# Patient Record
Sex: FEMALE | Race: BLACK OR AFRICAN AMERICAN | Employment: OTHER | URBAN - METROPOLITAN AREA
[De-identification: names, ages, dates, MRNs, and addresses within clinical notes are randomized per-mention and may not be internally consistent; named-entity substitution may affect disease eponyms.]

---

## 2017-11-02 ENCOUNTER — HOSPITAL ENCOUNTER (OUTPATIENT)
Dept: PREADMISSION TESTING | Age: 67
Discharge: HOME | End: 2017-11-02
Payer: MEDICARE

## 2017-11-02 VITALS
HEIGHT: 65 IN | WEIGHT: 204 LBS | TEMPERATURE: 97.8 F | BODY MASS INDEX: 33.99 KG/M2 | HEART RATE: 85 BPM | DIASTOLIC BLOOD PRESSURE: 83 MMHG | SYSTOLIC BLOOD PRESSURE: 143 MMHG

## 2017-11-02 LAB
ABO + RH BLD: NORMAL
ANION GAP SERPL CALC-SCNC: 7 MMOL/L (ref 5–15)
APPEARANCE UR: CLEAR
ATRIAL RATE: 64 BPM
BACTERIA URNS QL MICRO: NEGATIVE /HPF
BILIRUB UR QL: NEGATIVE
BLOOD GROUP ANTIBODIES SERPL: NORMAL
BUN SERPL-MCNC: 13 MG/DL (ref 6–20)
BUN/CREAT SERPL: 20 (ref 12–20)
CALCIUM SERPL-MCNC: 9.1 MG/DL (ref 8.5–10.1)
CALCULATED P AXIS, ECG09: 56 DEGREES
CALCULATED R AXIS, ECG10: -41 DEGREES
CALCULATED T AXIS, ECG11: 3 DEGREES
CHLORIDE SERPL-SCNC: 100 MMOL/L (ref 97–108)
CO2 SERPL-SCNC: 30 MMOL/L (ref 21–32)
COLOR UR: NORMAL
CREAT SERPL-MCNC: 0.65 MG/DL (ref 0.55–1.02)
DIAGNOSIS, 93000: NORMAL
EPITH CASTS URNS QL MICRO: NORMAL /LPF
ERYTHROCYTE [DISTWIDTH] IN BLOOD BY AUTOMATED COUNT: 13.2 % (ref 11.5–14.5)
EST. AVERAGE GLUCOSE BLD GHB EST-MCNC: 117 MG/DL
GLUCOSE SERPL-MCNC: 98 MG/DL (ref 65–100)
GLUCOSE UR STRIP.AUTO-MCNC: NEGATIVE MG/DL
HBA1C MFR BLD: 5.7 % (ref 4.2–6.3)
HCT VFR BLD AUTO: 40.3 % (ref 35–47)
HGB BLD-MCNC: 13 G/DL (ref 11.5–16)
HGB UR QL STRIP: NEGATIVE
HYALINE CASTS URNS QL MICRO: NORMAL /LPF (ref 0–5)
INR PPP: 1 (ref 0.9–1.1)
KETONES UR QL STRIP.AUTO: NEGATIVE MG/DL
LEUKOCYTE ESTERASE UR QL STRIP.AUTO: NEGATIVE
MCH RBC QN AUTO: 28.3 PG (ref 26–34)
MCHC RBC AUTO-ENTMCNC: 32.3 G/DL (ref 30–36.5)
MCV RBC AUTO: 87.6 FL (ref 80–99)
NITRITE UR QL STRIP.AUTO: NEGATIVE
P-R INTERVAL, ECG05: 170 MS
PH UR STRIP: 5 [PH] (ref 5–8)
PLATELET # BLD AUTO: 269 K/UL (ref 150–400)
POTASSIUM SERPL-SCNC: 4.3 MMOL/L (ref 3.5–5.1)
PROT UR STRIP-MCNC: NEGATIVE MG/DL
PROTHROMBIN TIME: 10 SEC (ref 9–11.1)
Q-T INTERVAL, ECG07: 424 MS
QRS DURATION, ECG06: 78 MS
QTC CALCULATION (BEZET), ECG08: 437 MS
RBC # BLD AUTO: 4.6 M/UL (ref 3.8–5.2)
RBC #/AREA URNS HPF: NORMAL /HPF (ref 0–5)
SODIUM SERPL-SCNC: 137 MMOL/L (ref 136–145)
SP GR UR REFRACTOMETRY: 1.02 (ref 1–1.03)
SPECIMEN EXP DATE BLD: NORMAL
UA: UC IF INDICATED,UAUC: NORMAL
UROBILINOGEN UR QL STRIP.AUTO: 0.2 EU/DL (ref 0.2–1)
VENTRICULAR RATE, ECG03: 64 BPM
WBC # BLD AUTO: 8.7 K/UL (ref 3.6–11)
WBC URNS QL MICRO: NORMAL /HPF (ref 0–4)

## 2017-11-02 PROCEDURE — 93005 ELECTROCARDIOGRAM TRACING: CPT

## 2017-11-02 PROCEDURE — 80048 BASIC METABOLIC PNL TOTAL CA: CPT | Performed by: ORTHOPAEDIC SURGERY

## 2017-11-02 PROCEDURE — 85027 COMPLETE CBC AUTOMATED: CPT | Performed by: ORTHOPAEDIC SURGERY

## 2017-11-02 PROCEDURE — 81001 URINALYSIS AUTO W/SCOPE: CPT | Performed by: ORTHOPAEDIC SURGERY

## 2017-11-02 PROCEDURE — 85610 PROTHROMBIN TIME: CPT | Performed by: ORTHOPAEDIC SURGERY

## 2017-11-02 PROCEDURE — 36415 COLL VENOUS BLD VENIPUNCTURE: CPT | Performed by: ORTHOPAEDIC SURGERY

## 2017-11-02 PROCEDURE — 83036 HEMOGLOBIN GLYCOSYLATED A1C: CPT | Performed by: ORTHOPAEDIC SURGERY

## 2017-11-02 PROCEDURE — 86900 BLOOD TYPING SEROLOGIC ABO: CPT | Performed by: ORTHOPAEDIC SURGERY

## 2017-11-02 RX ORDER — AMITRIPTYLINE HYDROCHLORIDE 100 MG/1
100 TABLET, FILM COATED ORAL
COMMUNITY
Start: 2017-10-30

## 2017-11-02 RX ORDER — CHOLECALCIFEROL (VITAMIN D3) 125 MCG
800 CAPSULE ORAL
COMMUNITY
End: 2017-11-10

## 2017-11-02 RX ORDER — BISMUTH SUBSALICYLATE 262 MG
1 TABLET,CHEWABLE ORAL DAILY
COMMUNITY

## 2017-11-02 RX ORDER — WARFARIN SODIUM 5 MG/1
TABLET ORAL
COMMUNITY
Start: 2017-10-31 | End: 2017-11-10

## 2017-11-02 NOTE — ADVANCED PRACTICE NURSE
Orthopedic pre-op assessment and planning form sent for scanning. Preoperative instructions reviewed with patient. Patient given 2-6 packs of CHG wipes. Instructions reviewed in class on use of CHG wipes. Patient given SSI infection FAQS sheet, as well as a  MRSA/MSSA treatment instruction sheet  With an explanation to patient that they will be notified if treatment instructions need to be initiated. Patient was given the opportunity to ask questions on the information provided.

## 2017-11-03 LAB
BACTERIA SPEC CULT: NORMAL
BACTERIA SPEC CULT: NORMAL
SERVICE CMNT-IMP: NORMAL

## 2017-11-06 PROBLEM — M16.11 PRIMARY OSTEOARTHRITIS OF RIGHT HIP: Status: ACTIVE | Noted: 2017-11-06

## 2017-11-06 NOTE — H&P
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 On her last visit I saw her for the right knee and at that time she received an aspiration and injection for the knee which was helpful. She now presents with severe hip pain. She feels that it is difficult to walk even across the room and feels that the pain affects her work activities. She denies left sided symptoms.          Objective:   Constitutional:  No acute distress. Well nourished. Well developed. Eyes:  Sclera are nonicteric. Respiratory:  No labored breathing. Cardiovascular:  No marked edema. Skin:  No marked skin ulcers. Neurological:  No marked sensory loss noted. Psychiatric: Alert and oriented x3. Musculoskeletal      On exam she ambulates with a marked limp. Any attempts to rotate the hip are painful. Her leg lengths are basically equal. She has a 10 degree flexion contracture on the right hip. No signs of infection. No effusion. No cellulitis or rash. No evidence of calf pain, no sign of DVT. The neurovascular status is intact. Radiographs:       I reviewed previous x-rays from August noting marked degenerative arthritis of the right hip with bone on bone contact. No imaging obtained       Assessment:      1. Primary osteoarthritis of right hip            Plan:   I reviewed with the patient that she has marked degenerative arthritis of the right hip and reviewed the pathophysiology. I discussed steroid injection but with the severity of arthritis I do not believe it will provide significant relief. I explained that the only option that would give her long term relief is right total hip replacement.     I explained the hospitalization, post-op and rehabilitation for the procedure. The patient is aware of all complications associated with the surgery, including the chance of infection and DVT. We discussed issues related to infection and DVT and we discussed Coumadin use. We also discussed issues related to leg length and instability.  She understands the period of post-op rehabilitation and time out of work following the surgery.     Following discussion she would like to proceed with right total hip replacement. She understands all potential complications    PROCEDURE: Right total hip replacement. Date of Surgery Update:  Regina Hernández was seen and examined. Past Medical History:   Diagnosis Date    Acid reflux     Arthritis     PUD (peptic ulcer disease)     CHILDHOOD     Prior to Admission Medications   Prescriptions Last Dose Informant Patient Reported? Taking?   amitriptyline (ELAVIL) 100 mg tablet 11/8/2017 at 0700  Yes Yes   Sig: Take 100 mg by mouth nightly. aspirin 500 mg tablet 10/25/2017  Yes No   Sig: Take 1,000 mg by mouth every three (3) days. multivitamin (ONE A DAY) tablet 10/8/2017 at Unknown time  Yes Yes   Sig: Take 1 Tab by mouth daily. naproxen sodium (ALEVE) 220 mg cap 10/25/2017  Yes No   Sig: Take 800 mg by mouth every three (3) days. warfarin (COUMADIN) 5 mg tablet 11/7/2017 at Unknown time  Yes Yes   Sig: Take 1 tablet po the night before surgery      Facility-Administered Medications: None      Allergy to:Codeine  Physical Examination: General appearance - alert, well appearing, and in no distress  History and physical has been reviewed. The patient has been examined.  There have been no significant clinical changes since the completion of the originally dated History and Physical.    Signed By: Alvis Lesches, PA-C     November 8, 2017 10:16 AM

## 2017-11-07 ENCOUNTER — ANESTHESIA EVENT (OUTPATIENT)
Dept: SURGERY | Age: 67
DRG: 470 | End: 2017-11-07
Payer: MEDICARE

## 2017-11-08 ENCOUNTER — ANESTHESIA (OUTPATIENT)
Dept: SURGERY | Age: 67
DRG: 470 | End: 2017-11-08
Payer: MEDICARE

## 2017-11-08 ENCOUNTER — APPOINTMENT (OUTPATIENT)
Dept: GENERAL RADIOLOGY | Age: 67
DRG: 470 | End: 2017-11-08
Attending: ORTHOPAEDIC SURGERY
Payer: MEDICARE

## 2017-11-08 ENCOUNTER — APPOINTMENT (OUTPATIENT)
Dept: GENERAL RADIOLOGY | Age: 67
DRG: 470 | End: 2017-11-08
Payer: MEDICARE

## 2017-11-08 ENCOUNTER — HOSPITAL ENCOUNTER (INPATIENT)
Age: 67
LOS: 2 days | Discharge: HOME HEALTH | DRG: 470 | End: 2017-11-10
Attending: ORTHOPAEDIC SURGERY | Admitting: ORTHOPAEDIC SURGERY
Payer: MEDICARE

## 2017-11-08 LAB
GLUCOSE BLD STRIP.AUTO-MCNC: 102 MG/DL (ref 65–100)
SERVICE CMNT-IMP: ABNORMAL

## 2017-11-08 PROCEDURE — 77030020788: Performed by: ORTHOPAEDIC SURGERY

## 2017-11-08 PROCEDURE — 77030031139 HC SUT VCRL2 J&J -A: Performed by: ORTHOPAEDIC SURGERY

## 2017-11-08 PROCEDURE — 74011000250 HC RX REV CODE- 250: Performed by: PHYSICIAN ASSISTANT

## 2017-11-08 PROCEDURE — 77030013079 HC BLNKT BAIR HGGR 3M -A: Performed by: ANESTHESIOLOGY

## 2017-11-08 PROCEDURE — 77030018836 HC SOL IRR NACL ICUM -A: Performed by: ORTHOPAEDIC SURGERY

## 2017-11-08 PROCEDURE — 77030012890

## 2017-11-08 PROCEDURE — 77030008467 HC STPLR SKN COVD -B: Performed by: ORTHOPAEDIC SURGERY

## 2017-11-08 PROCEDURE — 77030035236 HC SUT PDS STRATFX BARB J&J -B: Performed by: ORTHOPAEDIC SURGERY

## 2017-11-08 PROCEDURE — 73501 X-RAY EXAM HIP UNI 1 VIEW: CPT

## 2017-11-08 PROCEDURE — 74011250637 HC RX REV CODE- 250/637: Performed by: PHYSICIAN ASSISTANT

## 2017-11-08 PROCEDURE — 74011250636 HC RX REV CODE- 250/636: Performed by: PHYSICIAN ASSISTANT

## 2017-11-08 PROCEDURE — 77030032490 HC SLV COMPR SCD KNE COVD -B: Performed by: ORTHOPAEDIC SURGERY

## 2017-11-08 PROCEDURE — 77030012935 HC DRSG AQUACEL BMS -B: Performed by: ORTHOPAEDIC SURGERY

## 2017-11-08 PROCEDURE — 0SR90JA REPLACEMENT OF RIGHT HIP JOINT WITH SYNTHETIC SUBSTITUTE, UNCEMENTED, OPEN APPROACH: ICD-10-PCS | Performed by: ORTHOPAEDIC SURGERY

## 2017-11-08 PROCEDURE — 76060000036 HC ANESTHESIA 2.5 TO 3 HR: Performed by: ORTHOPAEDIC SURGERY

## 2017-11-08 PROCEDURE — 77030034850: Performed by: ORTHOPAEDIC SURGERY

## 2017-11-08 PROCEDURE — 82962 GLUCOSE BLOOD TEST: CPT

## 2017-11-08 PROCEDURE — 74011000258 HC RX REV CODE- 258

## 2017-11-08 PROCEDURE — 76010000172 HC OR TIME 2.5 TO 3 HR INTENSV-TIER 1: Performed by: ORTHOPAEDIC SURGERY

## 2017-11-08 PROCEDURE — 76210000006 HC OR PH I REC 0.5 TO 1 HR: Performed by: ORTHOPAEDIC SURGERY

## 2017-11-08 PROCEDURE — 74011250636 HC RX REV CODE- 250/636: Performed by: ANESTHESIOLOGY

## 2017-11-08 PROCEDURE — 77030006822 HC BLD SAW SAG BRSM -B: Performed by: ORTHOPAEDIC SURGERY

## 2017-11-08 PROCEDURE — 74011000250 HC RX REV CODE- 250

## 2017-11-08 PROCEDURE — 74011250636 HC RX REV CODE- 250/636

## 2017-11-08 PROCEDURE — 77030020782 HC GWN BAIR PAWS FLX 3M -B

## 2017-11-08 PROCEDURE — 77030018846 HC SOL IRR STRL H20 ICUM -A: Performed by: ORTHOPAEDIC SURGERY

## 2017-11-08 PROCEDURE — 77030012406 HC DRN WND PENRS BARD -A: Performed by: ORTHOPAEDIC SURGERY

## 2017-11-08 PROCEDURE — 77030011264 HC ELECTRD BLD EXT COVD -A: Performed by: ORTHOPAEDIC SURGERY

## 2017-11-08 PROCEDURE — 77030011640 HC PAD GRND REM COVD -A: Performed by: ORTHOPAEDIC SURGERY

## 2017-11-08 PROCEDURE — 77030007866 HC KT SPN ANES BBMI -B: Performed by: ANESTHESIOLOGY

## 2017-11-08 PROCEDURE — C1776 JOINT DEVICE (IMPLANTABLE): HCPCS | Performed by: ORTHOPAEDIC SURGERY

## 2017-11-08 PROCEDURE — 65270000029 HC RM PRIVATE

## 2017-11-08 DEVICE — SCR ACET CANC PINN 6.5X15MM SS --: Type: IMPLANTABLE DEVICE | Site: HIP | Status: FUNCTIONAL

## 2017-11-08 DEVICE — STEM FEMORAL SUMMIT: Type: IMPLANTABLE DEVICE | Site: HIP | Status: FUNCTIONAL

## 2017-11-08 DEVICE — COMPONENT TOT HIP PRIMARY MTL ON POLYETH: Type: IMPLANTABLE DEVICE | Status: FUNCTIONAL

## 2017-11-08 DEVICE — LINER ACET OD54MM ID36MM HIP ALTRX PINN: Type: IMPLANTABLE DEVICE | Site: HIP | Status: FUNCTIONAL

## 2017-11-08 DEVICE — CUP ACET DIA54MM HIP GRIPTION PRI CEMENTLESS FIX SECT SER: Type: IMPLANTABLE DEVICE | Site: HIP | Status: FUNCTIONAL

## 2017-11-08 DEVICE — HEAD FEM DIA36MM -2MM OFFSET 12/14 TAPR ARTC EZ HIP MTL: Type: IMPLANTABLE DEVICE | Site: HIP | Status: FUNCTIONAL

## 2017-11-08 RX ORDER — MIDAZOLAM HYDROCHLORIDE 1 MG/ML
0.5 INJECTION, SOLUTION INTRAMUSCULAR; INTRAVENOUS
Status: DISCONTINUED | OUTPATIENT
Start: 2017-11-08 | End: 2017-11-08 | Stop reason: HOSPADM

## 2017-11-08 RX ORDER — CEFAZOLIN SODIUM/WATER 2 G/20 ML
2 SYRINGE (ML) INTRAVENOUS EVERY 8 HOURS
Status: COMPLETED | OUTPATIENT
Start: 2017-11-08 | End: 2017-11-09

## 2017-11-08 RX ORDER — TRAMADOL HYDROCHLORIDE 50 MG/1
50 TABLET ORAL
Status: DISCONTINUED | OUTPATIENT
Start: 2017-11-08 | End: 2017-11-10 | Stop reason: HOSPADM

## 2017-11-08 RX ORDER — MIDAZOLAM HYDROCHLORIDE 1 MG/ML
INJECTION, SOLUTION INTRAMUSCULAR; INTRAVENOUS AS NEEDED
Status: DISCONTINUED | OUTPATIENT
Start: 2017-11-08 | End: 2017-11-08 | Stop reason: HOSPADM

## 2017-11-08 RX ORDER — AMOXICILLIN 250 MG
1 CAPSULE ORAL 2 TIMES DAILY
Status: DISCONTINUED | OUTPATIENT
Start: 2017-11-08 | End: 2017-11-10 | Stop reason: HOSPADM

## 2017-11-08 RX ORDER — ACETAMINOPHEN 325 MG/1
650 TABLET ORAL
Status: DISCONTINUED | OUTPATIENT
Start: 2017-11-08 | End: 2017-11-10 | Stop reason: HOSPADM

## 2017-11-08 RX ORDER — MIDAZOLAM HYDROCHLORIDE 1 MG/ML
1 INJECTION, SOLUTION INTRAMUSCULAR; INTRAVENOUS AS NEEDED
Status: DISCONTINUED | OUTPATIENT
Start: 2017-11-08 | End: 2017-11-08 | Stop reason: HOSPADM

## 2017-11-08 RX ORDER — NALOXONE HYDROCHLORIDE 0.4 MG/ML
0.4 INJECTION, SOLUTION INTRAMUSCULAR; INTRAVENOUS; SUBCUTANEOUS AS NEEDED
Status: DISCONTINUED | OUTPATIENT
Start: 2017-11-08 | End: 2017-11-10 | Stop reason: HOSPADM

## 2017-11-08 RX ORDER — ONDANSETRON 2 MG/ML
4 INJECTION INTRAMUSCULAR; INTRAVENOUS
Status: ACTIVE | OUTPATIENT
Start: 2017-11-08 | End: 2017-11-09

## 2017-11-08 RX ORDER — SODIUM CHLORIDE 0.9 % (FLUSH) 0.9 %
5-10 SYRINGE (ML) INJECTION EVERY 8 HOURS
Status: DISCONTINUED | OUTPATIENT
Start: 2017-11-09 | End: 2017-11-10 | Stop reason: HOSPADM

## 2017-11-08 RX ORDER — SODIUM CHLORIDE 9 MG/ML
1000 INJECTION, SOLUTION INTRAVENOUS CONTINUOUS
Status: DISCONTINUED | OUTPATIENT
Start: 2017-11-08 | End: 2017-11-08 | Stop reason: HOSPADM

## 2017-11-08 RX ORDER — ACETAMINOPHEN 10 MG/ML
INJECTION, SOLUTION INTRAVENOUS AS NEEDED
Status: DISCONTINUED | OUTPATIENT
Start: 2017-11-08 | End: 2017-11-08 | Stop reason: HOSPADM

## 2017-11-08 RX ORDER — DEXAMETHASONE SODIUM PHOSPHATE 100 MG/10ML
10 INJECTION INTRAMUSCULAR; INTRAVENOUS ONCE
Status: DISCONTINUED | OUTPATIENT
Start: 2017-11-08 | End: 2017-11-08 | Stop reason: HOSPADM

## 2017-11-08 RX ORDER — SODIUM CHLORIDE 0.9 % (FLUSH) 0.9 %
5-10 SYRINGE (ML) INJECTION AS NEEDED
Status: DISCONTINUED | OUTPATIENT
Start: 2017-11-08 | End: 2017-11-10 | Stop reason: HOSPADM

## 2017-11-08 RX ORDER — ONDANSETRON 2 MG/ML
4 INJECTION INTRAMUSCULAR; INTRAVENOUS AS NEEDED
Status: DISCONTINUED | OUTPATIENT
Start: 2017-11-08 | End: 2017-11-08 | Stop reason: HOSPADM

## 2017-11-08 RX ORDER — LIDOCAINE HYDROCHLORIDE 20 MG/ML
INJECTION, SOLUTION EPIDURAL; INFILTRATION; INTRACAUDAL; PERINEURAL AS NEEDED
Status: DISCONTINUED | OUTPATIENT
Start: 2017-11-08 | End: 2017-11-08 | Stop reason: HOSPADM

## 2017-11-08 RX ORDER — DEXTROSE, SODIUM CHLORIDE, SODIUM LACTATE, POTASSIUM CHLORIDE, AND CALCIUM CHLORIDE 5; .6; .31; .03; .02 G/100ML; G/100ML; G/100ML; G/100ML; G/100ML
25 INJECTION, SOLUTION INTRAVENOUS CONTINUOUS
Status: DISCONTINUED | OUTPATIENT
Start: 2017-11-08 | End: 2017-11-08 | Stop reason: HOSPADM

## 2017-11-08 RX ORDER — BUPIVACAINE HYDROCHLORIDE 5 MG/ML
INJECTION, SOLUTION EPIDURAL; INTRACAUDAL AS NEEDED
Status: DISCONTINUED | OUTPATIENT
Start: 2017-11-08 | End: 2017-11-08 | Stop reason: HOSPADM

## 2017-11-08 RX ORDER — FENTANYL CITRATE 50 UG/ML
50 INJECTION, SOLUTION INTRAMUSCULAR; INTRAVENOUS AS NEEDED
Status: DISCONTINUED | OUTPATIENT
Start: 2017-11-08 | End: 2017-11-08 | Stop reason: HOSPADM

## 2017-11-08 RX ORDER — SODIUM CHLORIDE 0.9 % (FLUSH) 0.9 %
5-10 SYRINGE (ML) INJECTION AS NEEDED
Status: DISCONTINUED | OUTPATIENT
Start: 2017-11-08 | End: 2017-11-08 | Stop reason: HOSPADM

## 2017-11-08 RX ORDER — SODIUM CHLORIDE 9 MG/ML
25 INJECTION, SOLUTION INTRAVENOUS CONTINUOUS
Status: DISCONTINUED | OUTPATIENT
Start: 2017-11-08 | End: 2017-11-08 | Stop reason: HOSPADM

## 2017-11-08 RX ORDER — SODIUM CHLORIDE 0.9 % (FLUSH) 0.9 %
5-10 SYRINGE (ML) INJECTION EVERY 8 HOURS
Status: DISCONTINUED | OUTPATIENT
Start: 2017-11-08 | End: 2017-11-08 | Stop reason: HOSPADM

## 2017-11-08 RX ORDER — HYDROXYZINE HYDROCHLORIDE 10 MG/1
10 TABLET, FILM COATED ORAL
Status: DISCONTINUED | OUTPATIENT
Start: 2017-11-08 | End: 2017-11-10 | Stop reason: HOSPADM

## 2017-11-08 RX ORDER — OXYCODONE HYDROCHLORIDE 5 MG/1
5 TABLET ORAL
Status: DISCONTINUED | OUTPATIENT
Start: 2017-11-08 | End: 2017-11-08

## 2017-11-08 RX ORDER — HYDROMORPHONE HYDROCHLORIDE 1 MG/ML
0.2 INJECTION, SOLUTION INTRAMUSCULAR; INTRAVENOUS; SUBCUTANEOUS
Status: DISCONTINUED | OUTPATIENT
Start: 2017-11-08 | End: 2017-11-08 | Stop reason: HOSPADM

## 2017-11-08 RX ORDER — DIPHENHYDRAMINE HYDROCHLORIDE 50 MG/ML
12.5 INJECTION, SOLUTION INTRAMUSCULAR; INTRAVENOUS AS NEEDED
Status: DISCONTINUED | OUTPATIENT
Start: 2017-11-08 | End: 2017-11-08 | Stop reason: HOSPADM

## 2017-11-08 RX ORDER — CEFAZOLIN SODIUM/WATER 2 G/20 ML
2 SYRINGE (ML) INTRAVENOUS EVERY 8 HOURS
Status: DISCONTINUED | OUTPATIENT
Start: 2017-11-08 | End: 2017-11-08 | Stop reason: HOSPADM

## 2017-11-08 RX ORDER — MORPHINE SULFATE 10 MG/ML
2 INJECTION, SOLUTION INTRAMUSCULAR; INTRAVENOUS
Status: DISCONTINUED | OUTPATIENT
Start: 2017-11-08 | End: 2017-11-08 | Stop reason: HOSPADM

## 2017-11-08 RX ORDER — AMITRIPTYLINE HYDROCHLORIDE 50 MG/1
100 TABLET, FILM COATED ORAL
Status: DISCONTINUED | OUTPATIENT
Start: 2017-11-08 | End: 2017-11-10 | Stop reason: HOSPADM

## 2017-11-08 RX ORDER — DEXAMETHASONE SODIUM PHOSPHATE 10 MG/ML
10 INJECTION INTRAMUSCULAR; INTRAVENOUS ONCE
Status: COMPLETED | OUTPATIENT
Start: 2017-11-09 | End: 2017-11-09

## 2017-11-08 RX ORDER — HYDROMORPHONE HYDROCHLORIDE 1 MG/ML
0.5 INJECTION, SOLUTION INTRAMUSCULAR; INTRAVENOUS; SUBCUTANEOUS
Status: ACTIVE | OUTPATIENT
Start: 2017-11-08 | End: 2017-11-09

## 2017-11-08 RX ORDER — FENTANYL CITRATE 50 UG/ML
25 INJECTION, SOLUTION INTRAMUSCULAR; INTRAVENOUS
Status: DISCONTINUED | OUTPATIENT
Start: 2017-11-08 | End: 2017-11-08 | Stop reason: HOSPADM

## 2017-11-08 RX ORDER — CELECOXIB 200 MG/1
200 CAPSULE ORAL 2 TIMES DAILY
Status: DISCONTINUED | OUTPATIENT
Start: 2017-11-08 | End: 2017-11-10 | Stop reason: HOSPADM

## 2017-11-08 RX ORDER — FAMOTIDINE 20 MG/1
20 TABLET, FILM COATED ORAL
Status: DISCONTINUED | OUTPATIENT
Start: 2017-11-08 | End: 2017-11-10 | Stop reason: HOSPADM

## 2017-11-08 RX ORDER — OXYCODONE HYDROCHLORIDE 5 MG/1
10 TABLET ORAL
Status: DISCONTINUED | OUTPATIENT
Start: 2017-11-08 | End: 2017-11-08

## 2017-11-08 RX ORDER — GLYCOPYRROLATE 0.2 MG/ML
INJECTION INTRAMUSCULAR; INTRAVENOUS AS NEEDED
Status: DISCONTINUED | OUTPATIENT
Start: 2017-11-08 | End: 2017-11-08 | Stop reason: HOSPADM

## 2017-11-08 RX ORDER — LIDOCAINE HYDROCHLORIDE 10 MG/ML
0.1 INJECTION, SOLUTION EPIDURAL; INFILTRATION; INTRACAUDAL; PERINEURAL AS NEEDED
Status: DISCONTINUED | OUTPATIENT
Start: 2017-11-08 | End: 2017-11-08 | Stop reason: HOSPADM

## 2017-11-08 RX ORDER — FENTANYL CITRATE 50 UG/ML
INJECTION, SOLUTION INTRAMUSCULAR; INTRAVENOUS AS NEEDED
Status: DISCONTINUED | OUTPATIENT
Start: 2017-11-08 | End: 2017-11-08 | Stop reason: HOSPADM

## 2017-11-08 RX ORDER — POLYETHYLENE GLYCOL 3350 17 G/17G
17 POWDER, FOR SOLUTION ORAL DAILY
Status: DISCONTINUED | OUTPATIENT
Start: 2017-11-09 | End: 2017-11-10 | Stop reason: HOSPADM

## 2017-11-08 RX ORDER — WARFARIN 10 MG/1
10 TABLET ORAL ONCE
Status: COMPLETED | OUTPATIENT
Start: 2017-11-08 | End: 2017-11-08

## 2017-11-08 RX ORDER — FACIAL-BODY WIPES
10 EACH TOPICAL DAILY PRN
Status: DISCONTINUED | OUTPATIENT
Start: 2017-11-10 | End: 2017-11-10 | Stop reason: HOSPADM

## 2017-11-08 RX ORDER — SODIUM CHLORIDE, SODIUM LACTATE, POTASSIUM CHLORIDE, CALCIUM CHLORIDE 600; 310; 30; 20 MG/100ML; MG/100ML; MG/100ML; MG/100ML
25 INJECTION, SOLUTION INTRAVENOUS CONTINUOUS
Status: DISCONTINUED | OUTPATIENT
Start: 2017-11-08 | End: 2017-11-08 | Stop reason: HOSPADM

## 2017-11-08 RX ORDER — CEFAZOLIN SODIUM IN 0.9 % NACL 2 G/100 ML
PLASTIC BAG, INJECTION (ML) INTRAVENOUS AS NEEDED
Status: DISCONTINUED | OUTPATIENT
Start: 2017-11-08 | End: 2017-11-08 | Stop reason: HOSPADM

## 2017-11-08 RX ORDER — SODIUM CHLORIDE, SODIUM LACTATE, POTASSIUM CHLORIDE, CALCIUM CHLORIDE 600; 310; 30; 20 MG/100ML; MG/100ML; MG/100ML; MG/100ML
INJECTION, SOLUTION INTRAVENOUS
Status: DISCONTINUED | OUTPATIENT
Start: 2017-11-08 | End: 2017-11-08 | Stop reason: HOSPADM

## 2017-11-08 RX ORDER — CELECOXIB 200 MG/1
200 CAPSULE ORAL ONCE
Status: COMPLETED | OUTPATIENT
Start: 2017-11-08 | End: 2017-11-08

## 2017-11-08 RX ORDER — DEXAMETHASONE SODIUM PHOSPHATE 4 MG/ML
INJECTION, SOLUTION INTRA-ARTICULAR; INTRALESIONAL; INTRAMUSCULAR; INTRAVENOUS; SOFT TISSUE AS NEEDED
Status: DISCONTINUED | OUTPATIENT
Start: 2017-11-08 | End: 2017-11-08 | Stop reason: HOSPADM

## 2017-11-08 RX ORDER — PROPOFOL 10 MG/ML
INJECTION, EMULSION INTRAVENOUS
Status: DISCONTINUED | OUTPATIENT
Start: 2017-11-08 | End: 2017-11-08 | Stop reason: HOSPADM

## 2017-11-08 RX ORDER — ONDANSETRON 2 MG/ML
INJECTION INTRAMUSCULAR; INTRAVENOUS AS NEEDED
Status: DISCONTINUED | OUTPATIENT
Start: 2017-11-08 | End: 2017-11-08 | Stop reason: HOSPADM

## 2017-11-08 RX ORDER — SODIUM CHLORIDE 9 MG/ML
125 INJECTION, SOLUTION INTRAVENOUS CONTINUOUS
Status: DISCONTINUED | OUTPATIENT
Start: 2017-11-08 | End: 2017-11-09

## 2017-11-08 RX ORDER — ACETAMINOPHEN 325 MG/1
650 TABLET ORAL EVERY 6 HOURS
Status: DISCONTINUED | OUTPATIENT
Start: 2017-11-08 | End: 2017-11-10 | Stop reason: HOSPADM

## 2017-11-08 RX ADMIN — MIDAZOLAM HYDROCHLORIDE 2 MG: 1 INJECTION, SOLUTION INTRAMUSCULAR; INTRAVENOUS at 11:40

## 2017-11-08 RX ADMIN — LIDOCAINE HYDROCHLORIDE 80 MG: 20 INJECTION, SOLUTION EPIDURAL; INFILTRATION; INTRACAUDAL; PERINEURAL at 11:51

## 2017-11-08 RX ADMIN — SODIUM CHLORIDE, SODIUM LACTATE, POTASSIUM CHLORIDE, CALCIUM CHLORIDE: 600; 310; 30; 20 INJECTION, SOLUTION INTRAVENOUS at 10:18

## 2017-11-08 RX ADMIN — AMITRIPTYLINE HYDROCHLORIDE 100 MG: 50 TABLET, FILM COATED ORAL at 22:41

## 2017-11-08 RX ADMIN — BUPIVACAINE HYDROCHLORIDE 15 MG: 5 INJECTION, SOLUTION EPIDURAL; INTRACAUDAL at 11:48

## 2017-11-08 RX ADMIN — ACETAMINOPHEN 650 MG: 325 TABLET ORAL at 22:41

## 2017-11-08 RX ADMIN — DOCUSATE SODIUM AND SENNOSIDES 1 TABLET: 8.6; 5 TABLET, FILM COATED ORAL at 18:28

## 2017-11-08 RX ADMIN — ONDANSETRON 4 MG: 2 INJECTION INTRAMUSCULAR; INTRAVENOUS at 11:52

## 2017-11-08 RX ADMIN — GLYCOPYRROLATE 0.2 MG: 0.2 INJECTION INTRAMUSCULAR; INTRAVENOUS at 11:51

## 2017-11-08 RX ADMIN — FENTANYL CITRATE 50 MCG: 50 INJECTION, SOLUTION INTRAMUSCULAR; INTRAVENOUS at 12:04

## 2017-11-08 RX ADMIN — FENTANYL CITRATE 50 MCG: 50 INJECTION, SOLUTION INTRAMUSCULAR; INTRAVENOUS at 11:40

## 2017-11-08 RX ADMIN — WARFARIN SODIUM 10 MG: 10 TABLET ORAL at 18:28

## 2017-11-08 RX ADMIN — ACETAMINOPHEN 1000 MG: 10 INJECTION, SOLUTION INTRAVENOUS at 13:51

## 2017-11-08 RX ADMIN — SODIUM CHLORIDE, SODIUM LACTATE, POTASSIUM CHLORIDE, CALCIUM CHLORIDE: 600; 310; 30; 20 INJECTION, SOLUTION INTRAVENOUS at 13:41

## 2017-11-08 RX ADMIN — Medication 2 G: at 12:16

## 2017-11-08 RX ADMIN — CELECOXIB 200 MG: 200 CAPSULE ORAL at 18:47

## 2017-11-08 RX ADMIN — SODIUM CHLORIDE 125 ML/HR: 900 INJECTION, SOLUTION INTRAVENOUS at 18:29

## 2017-11-08 RX ADMIN — DEXAMETHASONE SODIUM PHOSPHATE 4 MG: 4 INJECTION, SOLUTION INTRA-ARTICULAR; INTRALESIONAL; INTRAMUSCULAR; INTRAVENOUS; SOFT TISSUE at 11:52

## 2017-11-08 RX ADMIN — CELECOXIB 200 MG: 200 CAPSULE ORAL at 10:23

## 2017-11-08 RX ADMIN — MIDAZOLAM HYDROCHLORIDE 3 MG: 1 INJECTION, SOLUTION INTRAMUSCULAR; INTRAVENOUS at 11:36

## 2017-11-08 RX ADMIN — PROPOFOL 100 MCG/KG/MIN: 10 INJECTION, EMULSION INTRAVENOUS at 11:51

## 2017-11-08 RX ADMIN — SODIUM CHLORIDE, SODIUM LACTATE, POTASSIUM CHLORIDE, CALCIUM CHLORIDE: 600; 310; 30; 20 INJECTION, SOLUTION INTRAVENOUS at 12:00

## 2017-11-08 RX ADMIN — Medication 2 G: at 19:38

## 2017-11-08 RX ADMIN — TRAMADOL HYDROCHLORIDE 50 MG: 50 TABLET, FILM COATED ORAL at 21:04

## 2017-11-08 RX ADMIN — SODIUM CHLORIDE, SODIUM LACTATE, POTASSIUM CHLORIDE, AND CALCIUM CHLORIDE 25 ML/HR: 600; 310; 30; 20 INJECTION, SOLUTION INTRAVENOUS at 10:22

## 2017-11-08 NOTE — PERIOP NOTES
TRANSFER - OUT REPORT:    Verbal report given to Russ Ceron RN(name) on Sincere Whitfield  being transferred to 570(unit) for routine post - op       Report consisted of patients Situation, Background, Assessment and   Recommendations(SBAR). Time Pre op antibiotic given:1216  Anesthesia Stop time: 1551  Ghosh Present on Transfer to floor:yes  Order for Ghosh on Chart:yes    Information from the following report(s) SBAR, OR Summary, Procedure Summary, Intake/Output, MAR, Recent Results, Med Rec Status and Cardiac Rhythm NSR was reviewed with the receiving nurse. Opportunity for questions and clarification was provided. Is the patient on 02? YES       L/Min 2       Other     Is the patient on a monitor? NO    Is the nurse transporting with the patient? NO    Surgical Waiting Area notified of patient's transfer from PACU? YES      The following personal items collected during your admission accompanied patient upon transfer:   Dental Appliance:    Vision:    Hearing Aid:    Jewelry: Jewelry: None  Clothing: Clothing: None (pt states all belongings are with family)  Other Valuables:  Other Valuables: (S) Cane (with family member)  Valuables sent to safe:

## 2017-11-08 NOTE — ANESTHESIA POSTPROCEDURE EVALUATION
Post-Anesthesia Evaluation and Assessment    Patient: Clifton Blackwood MRN: [id number]  SSN: [id number]    YOB: 1950  Age: 74 y.o. Sex: female       Cardiovascular Function/Vital Signs  Visit Vitals    /56    Pulse 100    Temp 36.8 °C (98.2 °F)    Resp 18    Ht 5' 5\" (1.651 m)    Wt 92.5 kg (204 lb)    SpO2 99%    BMI 33.95 kg/m2       Patient is status post spinal, total IV anesthesia anesthesia for Procedure(s):  RIGHT TOTAL HIP ARTHROPLASTY . Nausea/Vomiting: None    Postoperative hydration reviewed and adequate. Pain:  Pain Scale 1: Numeric (0 - 10) (11/08/17 1010)  Pain Intensity 1: 8 (11/08/17 1010)   Managed    Neurological Status:   Neuro (WDL): Within Defined Limits (11/08/17 1013)   At baseline    Mental Status and Level of Consciousness: Arousable    Pulmonary Status:   O2 Device: Nasal cannula (11/08/17 1426)   Adequate oxygenation and airway patent    Complications related to anesthesia: None    Post-anesthesia assessment completed.  No concerns    Signed By: Olegario Morrell MD     November 8, 2017

## 2017-11-08 NOTE — PERIOP NOTES
I attempted to contact the patients daughter in surgical waiting. Per the volunteer there was no response from the paging system. I left a message regarding surgical start time and provided a patient status update.

## 2017-11-08 NOTE — PROGRESS NOTES
Primary Nurse Martina Ramirez RN and Boaz Trujillo RN performed a dual skin assessment on this patient No impairment noted  Mj score is 21

## 2017-11-08 NOTE — ANESTHESIA PROCEDURE NOTES
Spinal Block    Start time: 11/8/2017 11:42 AM  End time: 11/8/2017 11:49 AM  Performed by: Isidro Cagle  Authorized by: Master Feldman     Pre-procedure:   Indications: at surgeon's request, post-op pain management, procedure for pain and primary anesthetic  Preanesthetic Checklist: patient identified, risks and benefits discussed, anesthesia consent, patient being monitored and timeout performed    Timeout Time: 11:42          Spinal Block:   Patient Position:  Seated  Prep Region:  Lumbar  Prep: Betadine and patient draped      Location:  L3-4  Technique:  Single shot  Local:  Lidocaine 1%  Local Dose (mL):  3    Needle:   Needle Type:  Pencan  Needle Gauge:  25 G  Attempts:  1      Events: CSF confirmed, no blood with aspiration and no paresthesia        Assessment:  Insertion:  Uncomplicated  Patient tolerance:  Patient tolerated the procedure well with no immediate complications  BATCH: [id number], EXP: 02/2019

## 2017-11-08 NOTE — PROGRESS NOTES
Patient ambulated into hallway. Orthostatics taken patient tolerated well. 11/08/17 1826   Vital Signs   Temp 98.2 °F (36.8 °C)   Temp Source Oral   Pulse (Heart Rate) 86   Heart Rate Source Monitor   Resp Rate 16   O2 Sat (%) 97 %   Level of Consciousness Alert   /64   MAP (Calculated) 77   BP 1 Method Automatic   BP 1 Location Right arm   BP Patient Position At rest   More BP/Pulse rows needed?  Yes   MEWS Score 1   Additional Blood Pressure/Pulse Data   Pulse 2 106   BP 2 119/77   MAP 2 (Calculated) 91   Patient Position 2 Sitting   Pulse 3 114   BP 3 100/69   MAP 3 (Calculated) 79   Patient Position 3 Standing

## 2017-11-08 NOTE — PROGRESS NOTES
Physical Therapy  Received consult, chart reviewed and spoke with RN, deferred evaluation as pt continues to be numb, will follow up tomorrow for evaluation  Rosa Isela Wright PT

## 2017-11-08 NOTE — PROGRESS NOTES
TRANSFER - IN REPORT:    Verbal report received from Kerrie(name) on Rajani Peters  being received from [employer]) for routine post - op      Report consisted of patients Situation, Background, Assessment and   Recommendations(SBAR). Information from the following report(s) SBAR, Kardex, Procedure Summary, Intake/Output, MAR and Recent Results was reviewed with the receiving nurse. Opportunity for questions and clarification was provided. Assessment completed upon patients arrival to unit and care assumed.

## 2017-11-08 NOTE — IP AVS SNAPSHOT
[address] [facility] 74 
[phone] Patient: Juanita Skinner MRN: [id number] ZCA:[birth year] About your hospitalization You were admitted on:  November 8, 2017 You last received care in the:  [facility] You were discharged on:  November 10, 2017 Why you were hospitalized Your primary diagnosis was:  Primary Osteoarthritis Of Right Hip Things You Need To Do (next 8 weeks) Follow up with Amanda Choe MD  
  
Phone:  [phone] [facility]:  [facility] Follow up with [address] [place] [address] Phone:  [phone] Where:  [address] [facility], [address] [place] [address] Discharge Orders None A check kevon indicates which time of day the medication should be taken. My Medications STOP taking these medications ALEVE 220 mg Cap Generic drug:  naproxen sodium  
   
  
 aspirin 500 mg tablet TAKE these medications as instructed Instructions Each Dose to Equal  
 Morning Noon Evening Bedtime  
 amitriptyline 100 mg tablet Commonly known as:  ELAVIL Your last dose was: Your next dose is: Take 100 mg by mouth nightly. 100 mg  
    
   
   
   
  
 celecoxib 200 mg capsule Commonly known as:  CeleBREX Your last dose was: Your next dose is: Take 1 Cap by mouth daily for 30 days. 200 mg  
    
   
   
   
  
 multivitamin tablet Commonly known as:  ONE A DAY Your last dose was: Your next dose is: Take 1 Tab by mouth daily. 1 Tab  
    
   
   
   
  
 traMADol 50 mg tablet Commonly known as:  ULTRAM  
   
Your last dose was: Your next dose is: Take 1 Tab by mouth every six (6) hours as needed for Pain. Max Daily Amount: 200 mg.  
 50 mg  
    
   
   
   
  
 warfarin 2.5 mg tablet Commonly known as:  COUMADIN Your last dose was: Your next dose is: Take 1 Tab by mouth daily. 2.5 mg Where to Get Your Medications Information on where to get these meds will be given to you by the nurse or doctor. ! Ask your nurse or doctor about these medications  
  celecoxib 200 mg capsule  
 traMADol 50 mg tablet  
 warfarin 2.5 mg tablet Discharge Instructions Patient meets criteria for BUNDLED PAYMENT  
for Care Improvement Initiative Criteria Contact Information for Orthopedic Nurse Navigator:     
SHARLA Borrero, RN-BC 
B:[phone] H:[phone] E:[phone] DC Orders All Total Hips Case Management for DC planning to Home HC . - PT 2 times a week for 2 weeks; 50% WBAT with AVOID sudden and extreme movement of your hip (surgical leg). - PT/INR Every Monday/Thursday for 2 weeks, Call Dr. Melinda Crooks with results ([phone]). - Remove staples at 2 weeks post-op; 11/22/17 . - Follow up in Office in 2 1/2 weeks. After Hospital Care Plan:  Discharge Instructions Hip Replacement-Dr. Melinda Crooks Patient Name: Syl Mak Date of procedure: 11/8/2017 Procedure: Procedure(s): RIGHT TOTAL HIP ARTHROPLASTY Surgeon: Jodine Gowers) and Role: 
   * Hieu Coleman MD - Primary PCP: Priya Patterson MD 
Date of discharge: No discharge date for patient encounter. Follow up appointments ? Follow up with Dr. Melinda Crooks in 2 ½ weeks. Call [phone] to make an appointment. ? If home health has been arranged for you the agency will contact you to arrange dates/times for visits. Please call them if you do not hear from them within 24 hours after you are discharged When to call your Orthopaedic Surgeon: Call [phone]. If you call after 5pm or on a weekend, the on call physician will be contacted ? Increased hip pain, unrelieved pain or if you have difficulty or are unable to walk ? Signs of infection-if your incision is red; continues to have drainage; drainage has a foul odor or if you have a persistent fever over 101 degrees ? Signs of a blood clot in your leg-calf pain, tenderness, redness, swelling of lower leg When to call your Primary Care Physician: 
? Concerns about medical conditions such as diabetes, high blood pressure, asthma, congestive heart failure ? Call if blood sugars are elevated, persistent headache or dizziness, coughing or congestion, constipation or diarrhea, burning with urination, abnormal heart rate When to call 911and go to the nearest emergency room 
? acute onset of chest pain, shortness of breath, difficulty breathing Activity ? 50% weight bearing with walker or crutches for 2 weeks, then as tolerated. Refer to pages 23-33 of your handbook for instructions and pictures ? Complete you Home Exercise Program daily as instructed by your therapist. Refer to pages 36-42 of your handbook for instructions and pictures ? Get up every one hour and walk (except at night when sleeping) ? AVOID sudden and extreme movement of your hip (surgical leg) ? Do not drive or operate heavy machinery Incision Care ? The Aquacel (brown, waterproof) surgical dressing is to remain on your hip for 7 days. On the 7th day have someone gently peel the dressing off by carefully lifting the edge and stretching it slightly to break the adhesive seal 
? You will have staples in your hip incision. They will be removed by the home health agency staff ? If your Aquacel dressing comes loose/off before the 7th day, you may replace it with a dry sterile gauze dressing; change it daily. Once your incision is not draining, you may leave it open to air ? You may take a shower with the Aquacel dressing in place.   Once the Aquacel is removed, you may shower and get your incision wet but do not submerge your incision under water in a bath tub, hot tub or swimming pool for 6 weeks after surgery. Preventing blood clots ? Take Coumadin as prescribed by Dr. Mary Bowers for one month following surgery ? Wear elastic stockings (TEDS) for 4 weeks. You should remove them for approximately 1 hour daily for showering/sponge bathing Pain management ? Take pain medication as prescribed; decrease the amount you use as your pain lessens ? Avoid alcoholic beverages while taking pain medication ? Please be aware that many medications contain Tylenol. We do not want you to over medicate so please read the information below as a guide. Do not take more than 3 Grams of Tylenol in a 24 hour period. (There are 1000 milligrams in one Gram) 
o  325 mg of Tylenol per tablet (do not take more than 9 tablets in 24 hours) 
o  500 mg of Tylenol per tablet (do not take more than 6 tablets in 24 hours) 
o  650 mg of Tylenol per tablet (do not take more than 4 tablets in 24 hours). ? You may place an ice bag on your hip for 15-20 minutes after exercising and as needed throughout the day and night Diet ? Resume usual diet; drink plenty of fluids; eat foods high in fiber ? You may want to take a stool softener (such as Senokot-S or Colace) to prevent constipation while you are taking pain medication. If constipation occurs, take a laxative (such as Dulcolax tablets, Milk of Magnesia, or a suppository) Home Health Care Protocol (to be followed by [address] [place] [address]) Nursing-see physicians order ? Draw a PT/INR per physicians order. Call results to Dr. Mary Bowers at [phone] ? Remove staples per physicians order ? Complete head to toe assessment, vital signs ? Medication reconciliation ? Review pain management ? Manage chronic medical conditions Physical Therapy-see physician's order Weight bearing status: 
Precautions at Admission: Fall, PWB (50%) Left Side Weight Bearing: Full Right Side Weight Bearing: Partial (%) (50%) ? Mobility Status: 
Supine to Sit: Supervision Sit to Stand: Stand-by asssistance (verbal cues for hand placement) Gait: 
Distance (ft): 200 Feet (ft) Ambulation - Level of Assistance: Contact guard assistance, Stand-by asssistance Assistive Device: Walker, rolling, Gait belt Gait Abnormalities: Antalgic, Decreased step clearance ADL status overall composite: 
  
Dressing Assistance: Supervision/set up Toilet Transfer : Contact guard assistance Physical Therapy ? Assessment and evaluation-bed mobility; functional transfers (bed, chair, bathroom, stairs); ambulation with equipment, car transfers, safety and ability to get out of house in the event of an emergency ? 50% weight bearing x 2 weeks then weight bearing as tolerated ? AVOID sudden and extreme movement of the hip (surgical leg) ? Discuss pain management ? Review how to do ADLs. Refer to pages 43-47 of handbook Home Exercise Program-refer to pages 36-42 of handbook hospitals & HEALTH SERVICES! [facility] introduces Kindful patient portal. Now you can access parts of your medical record, email your doctor's office, and request medication refills online. 1. In your internet browser, go to [email or web address] 2. Click on the First Time User? Click Here link in the Sign In box. You will see the New Member Sign Up page. 3. Enter your Kindful Access Code exactly as it appears below. You will not need to use this code after youve completed the sign-up process. If you do not sign up before the expiration date, you must request a new code. · Kindful Access Code: [email or web address] Expires: 1/31/2018  8:16 AM 
 
4. Enter the last four digits of your Social Security Number (xxxx) and Date of Birth (mm/dd/yyyy) as indicated and click Submit. You will be taken to the next sign-up page. 5. Create a Kindful ID. This will be your Kindful login ID and cannot be changed, so think of one that is secure and easy to remember. 6. Create a [employer] password. You can change your password at any time. 7. Enter your Password Reset Question and Answer. This can be used at a later time if you forget your password. 8. Enter your e-mail address. You will receive e-mail notification when new information is available in [address] 9. Click Sign Up. You can now view and download portions of your medical record. 10. Click the Download Summary menu link to download a portable copy of your medical information. If you have questions, please visit the Frequently Asked Questions section of the [employer] website. Remember, [employer] is NOT to be used for urgent needs. For medical emergencies, dial 911. Now available from your iPhone and Android! Providers Seen During Your Hospitalization Provider Specialty Primary office phone Reza Ruelas MD Orthopedic Surgery [phone] Your Primary Care Physician (PCP) Primary Care Physician Office Phone Office Fax [address] [phone] You are allergic to the following Allergen Reactions Codeine Other (comments) HALLUCINATIONS Recent Documentation Height Weight BMI OB Status Smoking Status 1.651 m 92.5 kg 33.93 kg/m2 Postmenopausal Never Smoker Emergency Contacts Name Discharge Info Relation Home Work [facility] DISCHARGE CAREGIVER [3] Daughter [21] [phone] Patient Belongings The following personal items are in your possession at time of discharge: 
     Visual Aid: Glasses      Home Medications: None   Jewelry: None  Clothing: None (pt states all belongings are with family)    Other Valuables: (S) Brittanie Mcrae (with family member) Please provide this summary of care documentation to your next provider. Signatures-by signing, you are acknowledging that this After Visit Summary has been reviewed with you and you have received a copy. Patient Signature:  ____________________________________________________________ Date:  ____________________________________________________________  
  
Scharlene Alosa Provider Signature:  ____________________________________________________________ Date:  ____________________________________________________________

## 2017-11-08 NOTE — BRIEF OP NOTE
BRIEF OPERATIVE NOTE    Date of Procedure: 11/8/2017   Preoperative Diagnosis: DJD RIGHT HIP  Postoperative Diagnosis: DJD RIGHT HIP    Procedure(s):  RIGHT TOTAL HIP ARTHROPLASTY   Surgeon(s) and Role:     * Viktoria Schmidt MD - Primary         Assistant Staff:  Physician Assistant: Oksana Post PA-C    Surgical Staff:  Circ-1: Jacqueline Wei RN  Circ-Relief: Freda Keita  Physician Assistant: Oksana Post PA-C  Scrub Tech-1: Leonela Jonub RN-Relief: Irais Valdez RN  Surg Asst-1: Stephaniema Rene Fox  Event Time In   Incision Start 1222   Incision Close      Anesthesia: Spinal   Estimated Blood Loss: 200 mL  Specimens: * No specimens in log *   Findings: DJD Right hip   Complications: None. Implants:   Implant Name Type Inv.  Item Serial No.  Lot No. LRB No. Used Action   CUP ACET SECTOR GRIPTION 54MM -- TI - SNA  CUP ACET SECTOR GRIPTION 54MM -- TI NA [facility] [employer] [id number] Right 1 Implanted   SCR ACET CANC PINN 6.5X15MM SS --  - SNA  SCR ACET CANC PINN 6.5X15MM SS --  NA [facility] [employer] [id number] Right [id number] Implanted   STEM FEM 12/14 TAPR 7 -- [place] - SNA  STEM FEM 12/14 TAPR 7 -- [place] NA [facility] [employer] [id number] Right 1 Implanted   LINER ACET PINN NEUT [id number] -- [employer] - SNA  LINER ACET PINN NEUT [id number] -- [employer] NA [facility] [employer] [id number] Right [id number] Implanted   HEAD FEM 36MM 2MM NK --  - SNA   HEAD FEM 36MM 2MM NK --  NA [facility] [employer] [id number] Right 1 Implanted

## 2017-11-08 NOTE — IP AVS SNAPSHOT
[address] [facility] 13 
[phone] Patient: Mendel Marines MRN: [id number] IJV:5/69/6510 My Medications STOP taking these medications ALEVE 220 mg Cap Generic drug:  naproxen sodium  
   
  
 aspirin 500 mg tablet TAKE these medications as instructed Instructions Each Dose to Equal  
 Morning Noon Evening Bedtime  
 amitriptyline 100 mg tablet Commonly known as:  ELAVIL Your last dose was: Your next dose is: Take 100 mg by mouth nightly. 100 mg  
    
   
   
   
  
 celecoxib 200 mg capsule Commonly known as:  CeleBREX Your last dose was: Your next dose is: Take 1 Cap by mouth daily for 30 days. 200 mg  
    
   
   
   
  
 multivitamin tablet Commonly known as:  ONE A DAY Your last dose was: Your next dose is: Take 1 Tab by mouth daily. 1 Tab  
    
   
   
   
  
 traMADol 50 mg tablet Commonly known as:  ULTRAM  
   
Your last dose was: Your next dose is: Take 1 Tab by mouth every six (6) hours as needed for Pain. Max Daily Amount: 200 mg.  
 50 mg  
    
   
   
   
  
 warfarin 2.5 mg tablet Commonly known as:  COUMADIN Your last dose was: Your next dose is: Take 1 Tab by mouth daily. 2.5 mg Where to Get Your Medications Information on where to get these meds will be given to you by the nurse or doctor. ! Ask your nurse or doctor about these medications  
  celecoxib 200 mg capsule  
 traMADol 50 mg tablet  
 warfarin 2.5 mg tablet

## 2017-11-09 LAB
ANION GAP SERPL CALC-SCNC: 6 MMOL/L (ref 5–15)
BUN SERPL-MCNC: 13 MG/DL (ref 6–20)
BUN/CREAT SERPL: 21 (ref 12–20)
CALCIUM SERPL-MCNC: 8.2 MG/DL (ref 8.5–10.1)
CHLORIDE SERPL-SCNC: 105 MMOL/L (ref 97–108)
CO2 SERPL-SCNC: 27 MMOL/L (ref 21–32)
CREAT SERPL-MCNC: 0.62 MG/DL (ref 0.55–1.02)
GLUCOSE SERPL-MCNC: 146 MG/DL (ref 65–100)
HGB BLD-MCNC: 8.7 G/DL (ref 11.5–16)
INR PPP: 1.1 (ref 0.9–1.1)
POTASSIUM SERPL-SCNC: 4 MMOL/L (ref 3.5–5.1)
PROTHROMBIN TIME: 11.2 SEC (ref 9–11.1)
SODIUM SERPL-SCNC: 138 MMOL/L (ref 136–145)

## 2017-11-09 PROCEDURE — 65270000029 HC RM PRIVATE

## 2017-11-09 PROCEDURE — 80048 BASIC METABOLIC PNL TOTAL CA: CPT | Performed by: PHYSICIAN ASSISTANT

## 2017-11-09 PROCEDURE — 97165 OT EVAL LOW COMPLEX 30 MIN: CPT

## 2017-11-09 PROCEDURE — 85610 PROTHROMBIN TIME: CPT | Performed by: PHYSICIAN ASSISTANT

## 2017-11-09 PROCEDURE — 97116 GAIT TRAINING THERAPY: CPT

## 2017-11-09 PROCEDURE — 97535 SELF CARE MNGMENT TRAINING: CPT

## 2017-11-09 PROCEDURE — 74011250636 HC RX REV CODE- 250/636: Performed by: PHYSICIAN ASSISTANT

## 2017-11-09 PROCEDURE — 85018 HEMOGLOBIN: CPT | Performed by: PHYSICIAN ASSISTANT

## 2017-11-09 PROCEDURE — 74011250637 HC RX REV CODE- 250/637: Performed by: PHYSICIAN ASSISTANT

## 2017-11-09 PROCEDURE — 74011250636 HC RX REV CODE- 250/636: Performed by: NURSE PRACTITIONER

## 2017-11-09 PROCEDURE — 36415 COLL VENOUS BLD VENIPUNCTURE: CPT | Performed by: PHYSICIAN ASSISTANT

## 2017-11-09 PROCEDURE — 77030012893

## 2017-11-09 PROCEDURE — 97110 THERAPEUTIC EXERCISES: CPT

## 2017-11-09 PROCEDURE — 97161 PT EVAL LOW COMPLEX 20 MIN: CPT

## 2017-11-09 PROCEDURE — 77030012890

## 2017-11-09 RX ORDER — SODIUM CHLORIDE 9 MG/ML
125 INJECTION, SOLUTION INTRAVENOUS CONTINUOUS
Status: DISPENSED | OUTPATIENT
Start: 2017-11-09 | End: 2017-11-09

## 2017-11-09 RX ADMIN — Medication 2 G: at 03:55

## 2017-11-09 RX ADMIN — CELECOXIB 200 MG: 200 CAPSULE ORAL at 09:11

## 2017-11-09 RX ADMIN — WARFARIN SODIUM 6 MG: 5 TABLET ORAL at 12:52

## 2017-11-09 RX ADMIN — AMITRIPTYLINE HYDROCHLORIDE 100 MG: 50 TABLET, FILM COATED ORAL at 22:57

## 2017-11-09 RX ADMIN — ACETAMINOPHEN 650 MG: 325 TABLET ORAL at 22:57

## 2017-11-09 RX ADMIN — ACETAMINOPHEN 650 MG: 325 TABLET ORAL at 17:43

## 2017-11-09 RX ADMIN — POLYETHYLENE GLYCOL 3350 17 G: 17 POWDER, FOR SOLUTION ORAL at 09:12

## 2017-11-09 RX ADMIN — SODIUM CHLORIDE 125 ML/HR: 900 INJECTION, SOLUTION INTRAVENOUS at 02:15

## 2017-11-09 RX ADMIN — ACETAMINOPHEN 650 MG: 325 TABLET ORAL at 11:54

## 2017-11-09 RX ADMIN — DOCUSATE SODIUM AND SENNOSIDES 1 TABLET: 8.6; 5 TABLET, FILM COATED ORAL at 17:43

## 2017-11-09 RX ADMIN — Medication 10 ML: at 22:58

## 2017-11-09 RX ADMIN — DOCUSATE SODIUM AND SENNOSIDES 1 TABLET: 8.6; 5 TABLET, FILM COATED ORAL at 09:11

## 2017-11-09 RX ADMIN — DEXAMETHASONE SODIUM PHOSPHATE 10 MG: 10 INJECTION, SOLUTION INTRAMUSCULAR; INTRAVENOUS at 11:54

## 2017-11-09 RX ADMIN — Medication 10 ML: at 15:04

## 2017-11-09 RX ADMIN — TRAMADOL HYDROCHLORIDE 50 MG: 50 TABLET, FILM COATED ORAL at 02:59

## 2017-11-09 RX ADMIN — SODIUM CHLORIDE 125 ML/HR: 900 INJECTION, SOLUTION INTRAVENOUS at 12:52

## 2017-11-09 RX ADMIN — TRAMADOL HYDROCHLORIDE 50 MG: 50 TABLET, FILM COATED ORAL at 15:08

## 2017-11-09 RX ADMIN — ACETAMINOPHEN 650 MG: 325 TABLET ORAL at 04:00

## 2017-11-09 RX ADMIN — TRAMADOL HYDROCHLORIDE 50 MG: 50 TABLET, FILM COATED ORAL at 09:11

## 2017-11-09 RX ADMIN — CELECOXIB 200 MG: 200 CAPSULE ORAL at 17:43

## 2017-11-09 NOTE — PROGRESS NOTES
NP ORTHO PROGRESS NOTE  Post Op day: 1 Day Post-Op  Admit date: 11/8/2017  Date of Surgery: 11/8/2017   Procedures: Procedure(s):  RIGHT TOTAL HIP ARTHROPLASTY   Admitting Physician: Crystal Hearn MD   Surgeon: Dinorah Tomlin) and Role:     Taqueria Siddiqui MD - Primary    Chart/Meds/Labs Reviewed  Current Facility-Administered Medications   Medication Dose Route Frequency    0.9% sodium chloride infusion  125 mL/hr IntraVENous CONTINUOUS    warfarin (COUMADIN) tablet 6 mg  6 mg Oral NOW    sodium chloride 0.9 % bolus infusion 500 mL  500 mL IntraVENous ONCE PRN    sodium chloride (NS) flush 5-10 mL  5-10 mL IntraVENous Q8H    sodium chloride (NS) flush 5-10 mL  5-10 mL IntraVENous PRN    acetaminophen (TYLENOL) tablet 650 mg  650 mg Oral Q6H    acetaminophen (TYLENOL) tablet 650 mg  650 mg Oral Q6H PRN    celecoxib (CELEBREX) capsule 200 mg  200 mg Oral BID    HYDROmorphone (PF) (DILAUDID) injection 0.5 mg  0.5 mg IntraVENous Q4H PRN    naloxone (NARCAN) injection 0.4 mg  0.4 mg IntraVENous PRN    dexamethasone (PF) (DECADRON) injection 10 mg  10 mg IntraVENous ONCE    ondansetron (ZOFRAN) injection 4 mg  4 mg IntraVENous Q4H PRN    hydrOXYzine HCl (ATARAX) tablet 10 mg  10 mg Oral Q8H PRN    famotidine (PEPCID) tablet 20 mg  20 mg Oral BID PRN    senna-docusate (PERICOLACE) 8.6-50 mg per tablet 1 Tab  1 Tab Oral BID    polyethylene glycol (MIRALAX) packet 17 g  17 g Oral DAILY    [START ON 11/10/2017] bisacodyl (DULCOLAX) suppository 10 mg  10 mg Rectal DAILY PRN    amitriptyline (ELAVIL) tablet 100 mg  100 mg Oral QHS    Warfarin MD to dose   Other PRN    traMADol (ULTRAM) tablet 50 mg  50 mg Oral Q6H PRN       Subjective:    Complaints: Just \"a bit foggy headed since surgery\"  Denies Dizziness, CP, SOB, N/V, Abdominal pain, numbness or tingling of extremities. Able to perform ankle pumps easily. Progressing with mobility. Incisional pain control: well controlled.   Pain Control:   Pain Assessment  Pain Scale 1: Numeric (0 - 10)  Pain Intensity 1: 1  Pain Onset 1: post op  Pain Location 1: Hip  Pain Orientation 1: Right  Pain Description 1: Aching  Pain Intervention(s) 1: Medication (see MAR), Cold pack, Distraction    Oral diet: Tolerating diet well    Objective:  General: Alert,Ox4, cooperative, NAD. Sitting up in chair  HEENT: Atraumatic, PERRL, anicteric sclerae  Lungs: Bilateral expansion. Equal excursion. No accessory muscle use. Gastrointestinal:  Soft, non-tender, non-distended  Extremities:  Neurovasc exam WDL. + DP pulses. Sensation intact to light touch. Motor: + DF/PF          Calves non-tender upon palpation or with passive stretch. No significant erythema or swelling. Dressing: clean, dry and intact.     Vital Signs:   Visit Vitals    BP 99/65 (BP 1 Location: Right arm, BP Patient Position: At rest)    Pulse 97    Temp 98.2 °F (36.8 °C)    Resp 16    Ht 5' 5\" (1.651 m)    Wt 92.5 kg (204 lb)    SpO2 97%    BMI 33.95 kg/m2    O2 Flow Rate (L/min): 2 l/min O2 Device: Room air   Patient Vitals for the past 24 hrs:   BP Temp Pulse Resp SpO2   11/09/17 1021 99/65 98.2 °F (36.8 °C) 97 16 97 %   11/09/17 0921 107/70 - (!) 117 - -   11/09/17 0912 129/66 - - - -   11/09/17 0309 99/65 98.4 °F (36.9 °C) 93 16 96 %   11/08/17 2352 122/78 98.3 °F (36.8 °C) 84 16 96 %   11/08/17 1919 122/72 98.2 °F (36.8 °C) 85 16 100 %   11/08/17 1826 102/64 98.2 °F (36.8 °C) 86 16 97 %   11/08/17 1726 116/66 97.6 °F (36.4 °C) 82 17 97 %   11/08/17 1629 118/86 97.8 °F (36.6 °C) 88 16 100 %   11/08/17 1609 - - 81 14 99 %   11/08/17 1600 110/58 - 82 15 99 %   11/08/17 1559 - 97.8 °F (36.6 °C) - - -   11/08/17 1553 - - 85 18 100 %   11/08/17 1552 - - 74 12 100 %   11/08/17 1545 113/48 - 73 10 100 %   11/08/17 1531 - - 74 11 100 %   11/08/17 1530 119/55 - 74 10 100 %   11/08/17 1517 - - 78 10 100 %   11/08/17 1515 120/58 - 80 - 99 %   11/08/17 1511 - - 80 12 100 %   11/08/17 1500 120/62 - 83 12 100 %   17 1445 123/64 - 90 16 99 %   17 1440 125/66 - 95 17 99 %   17 1439 - 98.2 °F (36.8 °C) - - -   17 1435 126/65 - (!) 102 19 99 %   17 1430 130/67 - (!) 104 18 100 %   17 1429 - - (!) 102 19 99 %   17 1428 - - 100 15 100 %   17 1427 - - (!) 102 15 99 %   17 1426 129/56 98.2 °F (36.8 °C) 100 18 99 %   17 1425 129/56 - - - -     Temp (24hrs), Av.1 °F (36.7 °C), Min:97.6 °F (36.4 °C), Max:98.4 °F (36.9 °C)      Intake/output-last 8 hours:        Intake/output- 24 hours:   1901 -  0700  In: 0831 [P.O.:2200;  I.V.:3574]  Out: 3645 [Urine:3200; Drains:45]    LAB:   Recent Results (from the past 24 hour(s))   METABOLIC PANEL, BASIC    Collection Time: 17  3:05 AM   Result Value Ref Range    Sodium 138 136 - 145 mmol/L    Potassium 4.0 3.5 - 5.1 mmol/L    Chloride 105 97 - 108 mmol/L    CO2 27 21 - 32 mmol/L    Anion gap 6 5 - 15 mmol/L    Glucose 146 (H) 65 - 100 mg/dL    BUN 13 6 - 20 MG/DL    Creatinine 0.62 0.55 - 1.02 MG/DL    BUN/Creatinine ratio 21 (H) 12 - 20      GFR est AA >60 >60 ml/min/1.73m2    GFR est non-AA >60 >60 ml/min/1.73m2    Calcium 8.2 (L) 8.5 - 10.1 MG/DL   HEMOGLOBIN    Collection Time: 17  3:05 AM   Result Value Ref Range    HGB 8.7 (L) 11.5 - 16.0 g/dL   PROTHROMBIN TIME + INR    Collection Time: 17  3:05 AM   Result Value Ref Range    INR 1.1 0.9 - 1.1      Prothrombin time 11.2 (H) 9.0 - 11.1 sec      Lab Results   Component Value Date/Time    INR 1.1 2017 03:05 AM    INR 1.0 2017 12:19 PM     Lab Results   Component Value Date/Time    HGB 8.7 2017 03:05 AM    HGB 13.0 2017 12:19 PM     Recent Labs      17   0305   NA  138   K  4.0   CL  105   BUN  13   CREA  0.62   GLU  146*   CA  8.2*       PT/OT:   Gait:  Gait  Base of Support: Widened  Speed/[place]: Pace decreased (<100 feet/min)  Step Length: Right shortened, Left shortened  Swing Pattern: Right asymmetrical  Stance: Right decreased  Gait Abnormalities: Antalgic, Decreased step clearance, Step to gait (trunk flexion)  Ambulation - Level of Assistance: Contact guard assistance  Distance (ft): 200 Feet (ft)  Assistive Device: Walker, rolling, Gait belt                 PATIENT MOBILITY  Bed Mobility Training  Supine to Sit: Supervision  Scooting: Supervision  Transfer Training  Sit to Stand: Contact guard assistance (cues for hand placement)  Stand to Sit: Contact guard assistance      Gait Training  Assistive Device: Walker, rolling, Gait belt  Ambulation - Level of Assistance: Contact guard assistance  Distance (ft): 200 Feet (ft)   Weight Bearing Status  Right Side Weight Bearing: Partial (%) (50%)  Left Side Weight Bearing: Full        Assessment and Plan    Principal Problem:    Primary osteoarthritis of right hip (2017)          POD#1 Procedure(s):  RIGHT TOTAL HIP ARTHROPLASTY   Expected acute blood loss anemia related to surgery. No indications of active bleeding. BP a bit on low side & with some tachycardia with OOB--watch hemodynamics. Maintain IV fluids until sure of good stability. Labs trending as expected. VTE prophylaxis: Warfarin, Mobilization, Ankle pumping exercises, SCDs   Weight bearin%   Pain management: well managed Multi-modal pain plan, see above for medication,  Ice packs & elevation of extremity per orders, active gentle ROM & mobilize frequently for short periods of time. PT & OT good progress  Chronic Conditions : Obesity. BMI noted above. stable no indications of concern. Follow-up with PCP. Wound benign. Neurovascularly intact. Progressing well & as expected --just be sure not to overextend time out of bed. Continue present plans per orthopedic attending surgeon & interdisciplinary team for joint replacement. Discharge Planning: Goal is home with home care services.   Plans per Dr. Lucien Courtney    Signed By: Chris Sol NP    RN, MSN, MA, Adult NP-BC

## 2017-11-09 NOTE — PROGRESS NOTES
Bedside and Verbal shift change report given to [employer] (oncoming nurse) by Harry Martin (offgoing nurse). Report included the following information SBAR, Kardex, Procedure Summary, Intake/Output, MAR and Recent Results.

## 2017-11-09 NOTE — CDMP QUERY
Patient is noted to have a BMI of 33.95 kg/m  Please clarify if this patient is:     =>Obesity (BMI of 30-39.9)  => Morbid Obesity  (BMI 40 or greater)  =>Overweight (BMI 25-29.9)  => Other weight status (specify  status)  => Unable to determine    The [address] [place] [address] has issued a statement indicating that, \"Individuals who are overweight, obese, or morbidly obese are at an increased risk for certain medical conditions when compared to persons of normal weight.  Therefore, these conditions are always clinically significant and reportable when documented by the provider. \"      Presentation:  Ht: 5' 5\" (1.651 m)  Wt: 92.5 kg (204 lb)          Please clarify and document your clinical opinion in the progress notes and discharge summary, including the definitive and or presumptive diagnosis, (suspected or probable), related to the above clinical findings. Please include clinical findings supporting your diagnosis.        Thank you,         Marga Agosto [facility]

## 2017-11-09 NOTE — OP NOTES
[address] [facility] [place] [address]   OP NOTE       Name:  Kae Prieto   MR#:  [id number]   :  1950   Account #:  [de-identified]    Surgery Date:  2017   Date of Adm:  2017       PREOPERATIVE DIAGNOSIS: Advanced degenerative arthritis of   right hip. POSTOPERATIVE DIAGNOSIS: Advanced degenerative arthritis of   right hip. PROCEDURES PERFORMED: Right total hip replacement. ANESTHESIA: Spinal.    ESTIMATED BLOOD LOSS: 200 mL. DRAINS: Hemovac x1. COMPLICATIONS: None. SPECIMENS REMOVED: Right femoral head. ASSISTANT: Mayi Morrow PA-C.    INDICATIONS: The patient has advanced degenerative arthritis of the   right hip. She has failed a long course of conservative treatment, now   presents for the above procedure. DESCRIPTION OF PROCEDURE: On the day of operation, the patient   was taken to the operating room, spinal anesthesia administered. The   patient was turned to the left lateral decubitus position. The right hip   was prepped and draped in the usual fashion. A mini posterolateral   incision was made over the hip. It was carried through subcutaneous   tissue. The tensor fascia molly were sharply divided. Fascia of the   gluteal muscles was divided in line with their fibers. Charnley retractor   was carefully placed. External rotator muscles were taken down. A T-  shaped posterior capsular incision made. The hip was dislocated,   noting marked degenerative changes. Oscillating saw was used to   make the femoral neck osteotomy. Retractors were carefully placed   around the acetabulum. The acetabulum was cleared of osteophytes   and soft tissue. The acetabulum was then reamed to 53 mm and felt to   have good coverage and good bone quality at this point. A 54 mm   [place] [employer] acetabular component was   press-fit into place and felt to have a tight fit. One superior screw was   placed.  A 36 mm trial liner placed. Attention was then turned to the   femur. Box osteotome was utilized. Femur was reamed to a #7 in the   [place] system. It was broached to a #7. Various head and neck trials   were utilized. The -2 x 36 head provided the best fit, range of motion,   with proper stability in all planes of motion. X-ray revealed proper   position of the components. Trial components were then removed. The   36 mm polyethylene liner placed in the acetabulum. Attention was then   turned to the femur. The standard #7 [place] poro-coated femoral   stem was press-fit into place and felt to have a tight fit. The -2 x 36   head was introduced and felt to be stable. Incisions were thoroughly   irrigated. Coagulation achieved with electrocautery. Posterior capsule   repaired with #1 Vicryl suture. A drain was placed deep in the hip. Fascia closed with #1 Vicryl, supplemented with #2 PDS, 2-0 Vicryl   was used to close subcutaneous tissue and staples used to close the   skin. Sterile dressings were applied. The patient was taken to the   recovery room in satisfactory condition. The assistant, Dawna Cooper PA-C, who assisted me with   positioning, retraction, implant installation, and incision closure. MD ISABEL Hampton / MALATHI   D:  11/08/2017   17:24   T:  11/09/2017   07:52   Job #:  [id number]

## 2017-11-09 NOTE — PROGRESS NOTES
Problem: Mobility Impaired (Adult and Pediatric)  Goal: *Acute Goals and Plan of Care (Insert Text)  Physical Therapy Goals  Initiated 11/9/2017    1. Patient will move from supine to sit and sit to supine , scoot up and down and roll side to side in bed with modified independence within 4 days. 2. Patient will perform sit to stand with modified independence within 4 days. 3. Patient will ambulate with modified independence for 150 feet with the least restrictive device within 4 days. 4. Patient will ascend/descend 8 stairs with bilateral handrail(s) with modified independence within 4 days. 5. Patient will perform hip home exercise program per protocol with independence within 4 days. physical Therapy TREATMENT  Patient: Mitchell Weston (82 y.o. female)  Date: 11/9/2017  Diagnosis: DJD RIGHT HIP  Primary osteoarthritis of right hip Primary osteoarthritis of right hip  Procedure(s) (LRB):  RIGHT TOTAL HIP ARTHROPLASTY  (Right) 1 Day Post-Op  Precautions: Fall, PWB (50%)    ASSESSMENT:  Patient received supine in bed, agreeable to therapy. Completed supine exercises as below. Great motion and strength with supine hip abduction. Patient's gait pattern improved this session, with less trunk flexion and more symmetrical gait. Adjusted patient's walker and this appeared to suit her better. She will benefit from training on how to adjust RW to proper height, as well as stair training tomorrow AM. Left supine in bed with ice to R hip. Recommend HHPT. Should clear for d/c after AM session tomorrow. Progression toward goals:  [x]      Improving appropriately and progressing toward goals  []      Improving slowly and progressing toward goals  []      Not making progress toward goals and plan of care will be adjusted     PLAN:  Patient continues to benefit from skilled intervention to address the above impairments. Continue treatment per established plan of care.   Discharge Recommendations:  Home Health  Further Equipment Recommendations for Discharge:  Patient owns     SUBJECTIVE:   Patient stated I feel less stiff.     OBJECTIVE DATA SUMMARY:   Critical Behavior:  Neurologic State: Alert  Orientation Level: Oriented X4  Cognition: Follows commands  Functional Mobility Training:  Bed Mobility:  Supine to Sit: Supervision  Scooting: Supervision  Transfers:  Sit to Stand: Stand-by asssistance (verbal cues for hand placement)  Stand to Sit: Supervision  Balance:  Sitting: Intact  Standing: Intact; With support  Ambulation/Gait Training:  Distance (ft): 200 Feet (ft)  Assistive Device: Walker, rolling;Gait belt  Ambulation - Level of Assistance: Contact guard assistance;Stand-by asssistance  Gait Abnormalities: Antalgic;Decreased step clearance  Right Side Weight Bearing: Partial (%) (50%)  Left Side Weight Bearing: Full  Base of Support: Widened  Stance: Right decreased  Speed/[place]: Pace decreased (<100 feet/min)  Step Length: Right shortened;Left lengthened  Swing Pattern: Right asymmetrical  Therapeutic Exercises:   SUPINE  EXERCISES   Sets   Reps   Active Active Assist   Passive Self ROM   Comments   Ankle Pumps 1 10 [x]                                           []                                           []                                           []                                              Quad Sets 1 10 [x]                                           []                                           []                                           []                                              Heel Slides 1 10 [x]                                           []                                           []                                           []                                              Hip Abduction 1 10 []                                           [x]                                           []                                           []                                              Hip External Rotation 1 10 [x] []                                           []                                           []                                              Glut Sets 1 10 [x]                                           []                                           []                                           []                                                  Pain:  Pain Scale 1: Numeric (0 - 10)  Pain Intensity 1: 3  Pain Location 1: Hip  Pain Orientation 1: Right  Pain Description 1: Aching  Pain Intervention(s) 1: Medication (see MAR); Cold pack; Distraction  Activity Tolerance:   Good  Please refer to the flowsheet for vital signs taken during this treatment.   After treatment:   []  Patient left in no apparent distress sitting up in chair  [x]  Patient left in no apparent distress in bed  [x]  Call bell left within reach  [x]  Nursing notified  []  Caregiver present  []  Bed alarm activated    COMMUNICATION/COLLABORATION:   The patients plan of care was discussed with: Registered Nurse    Génesis Brito PT, DPT   Time Calculation: 25 mins

## 2017-11-09 NOTE — PROGRESS NOTES
Occupational Therapy EVALUATION/discharge  Patient: Alem Koenig (84 y.o. female)  Date: 11/9/2017  Primary Diagnosis: DJD RIGHT HIP  Primary osteoarthritis of right hip  Procedure(s) (LRB):  RIGHT TOTAL HIP ARTHROPLASTY  (Right) 1 Day Post-Op   Precautions:   Fall, PWB (50%) RLE    ASSESSMENT:   Based on the objective data described below, the patient presents with ability to complete functional mobility/transfers at [facility] level with CGA, UB ADLs with Mod I, LB ADLs with Min-ModA with no AE and Sup/S-U with LB AE. Pt was received supine in bed, pleasant and agreeable to OT eval. Pt works as traveling pediatrician, currently living in [address] [place] [address] home with son and daughter, planning to move in the new year, previously indep with all ADLs/IADLs. Pt agreeable to OOB mobility to/from bathroom for toileting and LB AE education while seated in chair. Pt required verbal cues for hand placement during STS (attempting to pull on RW), good understanding and carryover, completed toileting with Sup and standing grooming task at sink with SBA, good awareness of safety precautions and able to maintain 50% WB status during mobility. Pt with good understanding during LB AE education, demo'd doff/don sock using reacher and sock aide with verbal/visual cues, educated on where/how to acquire hip kit for home use. At this time, pt is clear from OT standpoint, no further OT needs at d/c and assistance from family as needed. Further skilled acute occupational therapy is not indicated at this time. Discharge Recommendations: None  Further Equipment Recommendations for Discharge: hip kit       SUBJECTIVE:   Patient stated I remember learning this at the pre-joint class.     OBJECTIVE DATA SUMMARY:   HISTORY:   Past Medical History:   Diagnosis Date    Acid reflux     Arthritis     PUD (peptic ulcer disease)     CHILDHOOD     Past Surgical History:   Procedure Laterality Date    HX HEENT  1976    MALOCCLUSION     HX HYSTEROSCOPY 2005    HX ROTATOR CUFF REPAIR Right 2008       Prior Level of Function/Environment/Context:  works as traveling pediatrician, currently living in [address] [place] [address] home with son and daughter, planning to move in the new year, previously indep with all ADLs/IADLs. Occupations in which the patient is/was successful, what are the barriers preventing that success: Pediatrician (traveling, part-time)    Home Situation  Home Environment: Private residence  # Steps to Enter: 4  Rails to Enter: Yes  Hand Rails : Bilateral  One/Two Story Residence: [facility] level  # of Interior Steps: 8  Interior Rails: Both  Living Alone: No  Support Systems: Child(sony)  Patient Expects to be Discharged to[de-identified] Private residence  Current DME Used/Available at Home: Dorn Olszewski, sundeep, [place], straight, Grab bars, Shower chair, Raised toilet seat  Tub or Shower Type: Tub/Shower combination  [x]  Right hand dominant   []  Left hand dominant    EXAMINATION OF PERFORMANCE DEFICITS:  Cognitive/Behavioral Status:  Neurologic State: Alert  Orientation Level: Oriented X4  Cognition: Follows commands       Skin: Intact BUE    Edema: None BUE    Hearing: Auditory  Auditory Impairment: None    Vision/Perceptual:           Acuity: Within Defined Limits         Range of Motion:  AROM: Within functional limits- BUE  PROM: Within functional limits- BUE          Strength:  Strength: Within functional limits- BUE        Coordination:  Coordination: Within functional limits  Fine Motor Skills-Upper: Left Intact; Right Intact    Gross Motor Skills-Upper: Left Intact; Right Intact    Tone & Sensation:  Tone: Normal- BUE  Sensation: Intact- BUE       Balance:  Sitting: Intact  Standing: Intact; With support    Functional Mobility and Transfers for ADLs:  Bed Mobility:  Supine to Sit: Supervision  Scooting: Supervision    Transfers:  Sit to Stand: Contact guard assistance (cues for hand placement)  Stand to Sit: Contact guard assistance  Toilet Transfer : Contact guard assistance    ADL Assessment:  Feeding: Modified independent    Oral Facial Hygiene/Grooming: Modified Independent    Bathing: Minimum assistance    Upper Body Dressing: Modified independent    Lower Body Dressing: Supervision (using AE)    Toileting: Supervision       ADL Intervention and task modifications:       Grooming  Grooming Assistance: Modified independent  Washing Hands: Modified independent    Lower Body Dressing Assistance  Dressing Assistance: Supervision/set up  Socks: Supervision/set-up  Position Performed: Seated in chair  Cues: Verbal cues provided;Visual cues provided  Adaptive Equipment Used: Reacher;Sock aid    Toileting  Toileting Assistance: Supervision/set up  Bladder Hygiene: Supervision/set-up  Clothing Management: Supervision/set-up  Adaptive Equipment: Elevated seat    Dressing joint: Patient instructed to don/doff RLE first/last.  Patient instructed and indicated understanding to don all clothing while sitting prior to standing, doff all clothing to knees while standing, then sit to doff clothing off from knees to feet in order to facilitate fall prevention, pain management, and energy conservation. Home safety: Patient instructed and indicated understanding on home modifications and safety (raise height of ADL objects, appropriate height of chair surfaces, recliner safety, change of floor surfaces, clear pathways) to increase independence and fall prevention. Standing: Patient instructed to walk up to sink/counter top/surfaces, step into walker to increase safety of joint and fall prevention. Patient educated to avoid extreme movements of R LE. Instructed to apply concept to ADLs within the home (no reaching across body to L side, square off while using objects, slide objects along surfaces).   Patient instructed to increase amount of time standing, observe standing position during ADLs in order to increase even weight bearing through bilateral LEs in order to increase independence with ADLs. Functional Measure:  Barthel Index:    Bathin  Bladder: 10  Bowels: 10  Groomin  Dressin  Feeding: 10  Mobility: 10  Stairs: 0  Toilet Use: 10  Transfer (Bed to Chair and Back): 10  Total: 70       Barthel and G-code impairment scale:  Percentage of impairment CH  0% CI  1-19% CJ  20-39% CK  40-59% CL  60-79% CM  80-99% CN  100%   Barthel Score 0-100 100 99-80 79-60 59-40 20-39 1-19   0   Barthel Score 0-20 20 17-19 13-16 9-12 5-8 1-4 0      The Barthel ADL Index: Guidelines  1. The index should be used as a record of what a patient does, not as a record of what a patient could do. 2. The main aim is to establish degree of independence from any help, physical or verbal, however minor and for whatever reason. 3. The need for supervision renders the patient not independent. 4. A patient's performance should be established using the best available evidence. Asking the patient, friends/relatives and nurses are the usual sources, but direct observation and common sense are also important. However direct testing is not needed. 5. Usually the patient's performance over the preceding 24-48 hours is important, but occasionally longer periods will be relevant. 6. Middle categories imply that the patient supplies over 50 per cent of the effort. 7. Use of aids to be independent is allowed. Kar Bridges., Barthel, D.W. ([id number]). Functional evaluation: the Barthel Index. [facility] (14)2. Tara Mercado marla Sadi, TREVORJISAAC, Simin ., [facility] [place] [facility], [address] [place] [address] (). Measuring the change indisability after inpatient rehabilitation; comparison of the responsiveness of the Barthel Index and Functional [place] Measure. Journal of Neurology, Neurosurgery, and Psychiatry, 66(4), 037-805. Lorena Urbano N.J.A, DEEPTI Galvin, & Ash Lay MContrerasA. (2004.) Assessment of post-stroke quality of life in cost-effectiveness studies: The usefulness of the Barthel Index and the EuroQoL-5D.  [employer] [place], [place], [phone]         G codes: In compliance with CMSs Claims Based Outcome Reporting, the following G-code set was chosen for this patient based on their primary functional limitation being treated: The outcome measure chosen to determine the severity of the functional limitation was the Barthel Index with a score of 70/100 which was correlated with the impairment scale. ? Self Care:     - CURRENT STATUS: CJ - 20%-39% impaired, limited or restricted    - GOAL STATUS: CJ - 20%-39% impaired, limited or restricted    - D/C STATUS:  CJ - 20%-39% impaired, limited or restricted       Occupational Therapy Evaluation Charge Determination   History Examination Decision-Making   LOW Complexity : Brief history review  LOW Complexity : 1-3 performance deficits relating to physical, cognitive , or psychosocial skils that result in activity limitations and / or participation restrictions  LOW Complexity : No comorbidities that affect functional and no verbal or physical assistance needed to complete eval tasks       Based on the above components, the patient evaluation is determined to be of the following complexity level: LOW   Pain:Pain Scale 1: Numeric (0 - 10)  Pain Intensity 1: 1  Pain Location 1: Hip  Pain Orientation 1: Right  Pain Description 1: Aching  Pain Intervention(s) 1: Medication (see MAR); Cold pack; Distraction  Activity Tolerance:   Good tolerance for OOB mobility to/from bathroom for toileting and standing grooming task at sink. No c/o dizziness, SOB, or fatigue. Please refer to the flowsheet for vital signs taken during this treatment.   After treatment:   [x]  Patient left in no apparent distress sitting up in chair  []  Patient left in no apparent distress in bed  [x]  Call bell left within reach  [x]  Nursing notified  []  Caregiver present  []  Bed alarm activated    COMMUNICATION/EDUCATION:   Communication/Collaboration:  [x]      Home safety education was provided and the patient/caregiver indicated understanding. [x]      Patient/family have participated as able and agree with findings and recommendations. []      Patient is unable to participate in plan of care at this time. Findings and recommendations were discussed with: Physical Therapist, Registered Nurse and patient    CR Zepeda  Time Calculation: 31 mins  Regarding student involvement in patient care:  A student participated in this treatment session. Per CMS Medicare statements and AOTA guidelines I certify that the following was true:  1. I was present and directly observed the entire session. 2. I made all skilled judgments and clinical decisions regarding care. 3. I am the practitioner responsible for assessment, treatment, and documentation.

## 2017-11-09 NOTE — PROGRESS NOTES
Bedside shift change report given to Fransisca Gutierrez RN (oncoming nurse) by Serjio Felton RN (offgoing nurse). Report included the following information SBAR, Kardex and Recent Results.

## 2017-11-09 NOTE — PROGRESS NOTES
Problem: Mobility Impaired (Adult and Pediatric)  Goal: *Acute Goals and Plan of Care (Insert Text)  Physical Therapy Goals  Initiated 11/9/2017    1. Patient will move from supine to sit and sit to supine , scoot up and down and roll side to side in bed with modified independence within 4 days. 2. Patient will perform sit to stand with modified independence within 4 days. 3. Patient will ambulate with modified independence for 150 feet with the least restrictive device within 4 days. 4. Patient will ascend/descend 8 stairs with bilateral handrail(s) with modified independence within 4 days. 5. Patient will perform hip home exercise program per protocol with independence within 4 days. physical Therapy EVALUATION  Patient: Elaine Adkins (31 y.o. female)  Date: 11/9/2017  Primary Diagnosis: DJD RIGHT HIP  Primary osteoarthritis of right hip  Procedure(s) (LRB):  RIGHT TOTAL HIP ARTHROPLASTY  (Right) 1 Day Post-Op   Precautions:   Fall, PWB (50%)    ASSESSMENT :  Based on the objective data described below, the patient presents with asymmetrical gait and decreased strength/ROM in R hip following R THR. Patient received sitting in chair post OT session. She required cues for hand placement during transfers but was able to do so without physical assist. Gait very asymmetrical, with increased trunk sway + trunk flexion. Cues for safe turns and sequencing. Patient somewhat rushed and required cues for standing rest break to recover. Returned to room and seated rest break prior to standing exercises as below. Plan to review supine exercises and advance gait this PM. Patient attended pre-op class and has no additional questions. Recommend HHPT. Patient will benefit from skilled intervention to address the above impairments.   Patients rehabilitation potential is considered to be Good  Factors which may influence rehabilitation potential include:   [x]         None noted  []         Mental ability/status  [] Medical condition  []         Home/family situation and support systems  []         Safety awareness  []         Pain tolerance/management  []         Other:      PLAN :  Recommendations and Planned Interventions:  [x]           Bed Mobility Training             [x]    Neuromuscular Re-Education  [x]           Transfer Training                   []    Orthotic/Prosthetic Training  [x]           Gait Training                         []    Modalities  [x]           Therapeutic Exercises           [x]    Edema Management/Control  [x]           Therapeutic Activities            [x]    Patient and Family Training/Education  []           Other (comment):    Frequency/Duration: Patient will be followed by physical therapy  twice daily to address goals. Discharge Recommendations: Home Health  Further Equipment Recommendations for Discharge: patient owns RW     SUBJECTIVE:   Patient stated I'm supposed to travel to work in [place] in January.     OBJECTIVE DATA SUMMARY:   HISTORY:    Past Medical History:   Diagnosis Date    Acid reflux     Arthritis     PUD (peptic ulcer disease)     CHILDHOOD     Past Surgical History:   Procedure Laterality Date    HX HEENT  1976    MALOCCLUSION     HX HYSTEROSCOPY  2005    HX ROTATOR CUFF REPAIR Right 2008     Prior Level of Function/Home Situation: Patient is a pediatric developmental pediatrician. She was mod I with a cane PTA.   Personal factors and/or comorbidities impacting plan of care: [facility]    Home Situation  Home Environment: Private residence  # Steps to Enter: 4  Rails to Enter: Yes  Hand Rails : Bilateral  One/Two Story Residence: Split level  # of Interior Steps: 8  Interior Rails: Both  Living Alone: No  Support Systems: Child(sony)  Patient Expects to be Discharged to[de-identified] Private residence  Current DME Used/Available at Home: Walker, rolling  Tub or Shower Type: Tub/Shower combination    EXAMINATION/PRESENTATION/DECISION MAKING:   Critical Behavior:  Neurologic State: Alert  Orientation Level: Oriented X4  Cognition: Follows commands  Hearing: Auditory  Auditory Impairment: None  Range Of Motion:  AROM: Within functional limits  Strength:    Strength: Within functional limits  Tone & Sensation:   Tone: Normal  Sensation: Intact  Coordination:  Coordination: Within functional limits  Functional Mobility:  Transfers:  Sit to Stand: Contact guard assistance (cues for hand placement)  Balance:   Sitting: Intact  Standing: Intact; With support  Ambulation/Gait Training:  Distance (ft): 200 Feet (ft)  Assistive Device: Walker, rolling;Gait belt  Ambulation - Level of Assistance: Contact guard assistance  Gait Abnormalities: Antalgic;Decreased step clearance; Step to gait (trunk flexion)  Right Side Weight Bearing: Partial (%) (50%)  Left Side Weight Bearing: Full  Base of Support: Widened  Stance: Right decreased  Speed/[place]: Pace decreased (<100 feet/min)  Step Length: Right shortened;Left shortened  Swing Pattern: Right asymmetrical  Therapeutic Exercises:     EXERCISE   Sets   Reps   Active Active Assist   Passive   Comments   Heel raises 1 10 [x]                        []                        []                           Hip abduction 1 10 [x]                        []                        []                                 Functional Measure:  Tinetti test:    Sitting Balance: 1  Arises: 1  Attempts to Rise: 1  Immediate Standing Balance: 1  Standing Balance: 1  Nudged: 1  Eyes Closed: 1  Turn 360 Degrees - Continuous/Discontinuous: 0  Turn 360 Degrees - Steady/Unsteady: 1  Sitting Down: 1  Balance Score: 9  Indication of Gait: 1  R Step Length/Height: 0  L Step Length/Height: 0  R Foot Clearance: 1  L Foot Clearance: 1  Step Symmetry: 0  Step Continuity: 0  Path: 1  Trunk: 0  Walking Time: 0  Gait Score: 4  Total Score: 13       Tinetti Test and G-code impairment scale:  Percentage of Impairment CH    0%   CI    1-19% CJ    20-39% CK    40-59% CL    60-79% CM    80-99% CN     100%   Tinetti  Score 0-28 28 23-27 17-22 12-16 6-11 1-5 0       Tinetti Tool Score Risk of Falls  <19 = High Fall Risk  19-24 = Moderate Fall Risk  25-28 = Low Fall Risk  Tinetti ME. Performance-Oriented Assessment of Mobility Problems in Elderly Patients. [facility] 66; [id number]. (Scoring Description: PT Bulletin Feb. 10, 1993)    Older adults: Anel De Leon et al, 2009; n = 1000 [facility] elderly evaluated with ABC, MADELINE, ADL, and IADL)  · Mean MADELINE score for males aged 69-68 years = 26.21(3.40)  · Mean MADELINE score for females age 69-68 years = 25.16(4.30)  · Mean MADELINE score for males over 80 years = 23.29(6.02)  · Mean MADELINE score for females over 80 years = 17.20(8.32)         G codes: In compliance with CMSs Claims Based Outcome Reporting, the following G-code set was chosen for this patient based on their primary functional limitation being treated: The outcome measure chosen to determine the severity of the functional limitation was the Tinetti with a score of 13/28 which was correlated with the impairment scale.     ? Mobility - Walking and Moving Around:     - CURRENT STATUS: CK - 40%-59% impaired, limited or restricted    - GOAL STATUS: CJ - 20%-39% impaired, limited or restricted    - D/C STATUS:  ---------------To be determined---------------      Physical Therapy Evaluation Charge Determination   History Examination Presentation Decision-Making   HIGH Complexity :3+ comorbidities / personal factors will impact the outcome/ POC  MEDIUM Complexity : 3 Standardized tests and measures addressing body structure, function, activity limitation and / or participation in recreation  LOW Complexity : Stable, uncomplicated  Other outcome measures Tinetti 13/28  MEDIUM      Based on the above components, the patient evaluation is determined to be of the following complexity level: LOW     Pain:  Pain Scale 1: Numeric (0 - 10)  Pain Intensity 1: 1  Pain Location 1: Hip  Pain Orientation 1: Right  Pain Description 1: Aching  Pain Intervention(s) 1: Medication (see MAR); Cold pack; Distraction  Activity Tolerance:   Good  Please refer to the flowsheet for vital signs taken during this treatment. After treatment:   [x]         Patient left in no apparent distress sitting up in chair  []         Patient left in no apparent distress in bed  [x]         Call bell left within reach  [x]         Nursing notified  []         Caregiver present  []         Bed alarm activated    COMMUNICATION/EDUCATION:   The patients plan of care was discussed with: Registered Nurse. [x]         Fall prevention education was provided and the patient/caregiver indicated understanding. [x]         Patient/family have participated as able in goal setting and plan of care. [x]         Patient/family agree to work toward stated goals and plan of care. []         Patient understands intent and goals of therapy, but is neutral about his/her participation. []         Patient is unable to participate in goal setting and plan of care.     Thank you for this referral.  Cedrick Castillo, PT, DPT   Time Calculation: 23 mins

## 2017-11-10 ENCOUNTER — HOME HEALTH ADMISSION (OUTPATIENT)
Dept: HOME HEALTH SERVICES | Facility: HOME HEALTH | Age: 67
End: 2017-11-10
Payer: MEDICARE

## 2017-11-10 VITALS
WEIGHT: 203.93 LBS | HEART RATE: 82 BPM | SYSTOLIC BLOOD PRESSURE: 120 MMHG | TEMPERATURE: 98 F | HEIGHT: 65 IN | RESPIRATION RATE: 16 BRPM | DIASTOLIC BLOOD PRESSURE: 64 MMHG | OXYGEN SATURATION: 95 % | BODY MASS INDEX: 33.98 KG/M2

## 2017-11-10 LAB
HGB BLD-MCNC: 7.9 G/DL (ref 11.5–16)
INR PPP: 1.6 (ref 0.9–1.1)
PROTHROMBIN TIME: 16.5 SEC (ref 9–11.1)

## 2017-11-10 PROCEDURE — 85610 PROTHROMBIN TIME: CPT | Performed by: PHYSICIAN ASSISTANT

## 2017-11-10 PROCEDURE — 97116 GAIT TRAINING THERAPY: CPT

## 2017-11-10 PROCEDURE — 77030012935 HC DRSG AQUACEL BMS -B

## 2017-11-10 PROCEDURE — 74011250637 HC RX REV CODE- 250/637: Performed by: PHYSICIAN ASSISTANT

## 2017-11-10 PROCEDURE — 85018 HEMOGLOBIN: CPT | Performed by: PHYSICIAN ASSISTANT

## 2017-11-10 PROCEDURE — 36415 COLL VENOUS BLD VENIPUNCTURE: CPT | Performed by: PHYSICIAN ASSISTANT

## 2017-11-10 RX ORDER — TRAMADOL HYDROCHLORIDE 50 MG/1
50 TABLET ORAL
Qty: 60 TAB | Refills: 0 | Status: SHIPPED | OUTPATIENT
Start: 2017-11-10

## 2017-11-10 RX ORDER — WARFARIN 2.5 MG/1
2.5 TABLET ORAL DAILY
Qty: 90 TAB | Refills: 1 | Status: SHIPPED | OUTPATIENT
Start: 2017-11-10

## 2017-11-10 RX ORDER — CELECOXIB 200 MG/1
200 CAPSULE ORAL DAILY
Qty: 30 CAP | Refills: 0 | Status: SHIPPED | OUTPATIENT
Start: 2017-11-10 | End: 2017-12-10

## 2017-11-10 RX ORDER — WARFARIN SODIUM 5 MG/1
5 TABLET ORAL
Status: COMPLETED | OUTPATIENT
Start: 2017-11-10 | End: 2017-11-10

## 2017-11-10 RX ADMIN — DOCUSATE SODIUM AND SENNOSIDES 1 TABLET: 8.6; 5 TABLET, FILM COATED ORAL at 08:31

## 2017-11-10 RX ADMIN — WARFARIN SODIUM 5 MG: 5 TABLET ORAL at 09:27

## 2017-11-10 RX ADMIN — CELECOXIB 200 MG: 200 CAPSULE ORAL at 08:31

## 2017-11-10 RX ADMIN — ACETAMINOPHEN 650 MG: 325 TABLET ORAL at 10:43

## 2017-11-10 RX ADMIN — POLYETHYLENE GLYCOL 3350 17 G: 17 POWDER, FOR SOLUTION ORAL at 08:30

## 2017-11-10 RX ADMIN — ACETAMINOPHEN 650 MG: 325 TABLET ORAL at 06:14

## 2017-11-10 RX ADMIN — Medication 10 ML: at 06:14

## 2017-11-10 NOTE — PROGRESS NOTES
Care Management Interventions  PCP Verified by CM: Yes (Dr. Hanane Gamez)  Palliative Care Criteria Met (RRAT>21 & CHF Dx)?: No  Mode of Transport at Discharge: Self  Transition of Care Consult (CM Consult): [address]: Yes  MyChart Signup: No  Discharge Durable Medical Equipment: No (owns RW)  Physical Therapy Consult: Yes  Occupational Therapy Consult: Yes  Speech Therapy Consult: No  Current Support Network: Lives with Spouse  Confirm Follow Up Transport: Family  Plan discussed with Pt/Family/Caregiver: Yes  Freedom of Choice Offered: Yes  Discharge Location  Discharge Placement: Home with home health    Home care orders noted. CRM met with the patient to offer agency choice. The patient chose [facility]. Referral sent via [facility] [address]. The patient will be discharged today. Will follow.  LISA

## 2017-11-10 NOTE — PROGRESS NOTES
I have reviewed discharge instructions with the patient. The patient verbalized understanding. MEFS reviewed and teaching provided for all medications being taken home; time allowed for questions/clarification. Patient being sent home with all belongings, wheeled down to patient discharge by a volunteer and transported home by her daughter.

## 2017-11-10 NOTE — PROGRESS NOTES
Bedside and Verbal shift change report given to [place](oncoming nurse) by Meryl Gregorio RN (offgoing nurse). Report given with Lane SMYTH and MAR.

## 2017-11-10 NOTE — PROGRESS NOTES
Bedside and Verbal shift change report given to Reece Bass (oncoming nurse) by Isac Chavez RN (offgoing nurse). Report given with Lane SMYTH and MAR.

## 2017-11-10 NOTE — DISCHARGE INSTRUCTIONS
Patient meets criteria for   BUNDLED PAYMENT   for Care Improvement Initiative Criteria    Contact Information for Orthopedic Nurse Navigator:      SHARLA Moore, RN-BC  V:[phone]  O:[phone]  O:[phone]    DC Orders All Total Hips    Case Management for DC planning to Home HC .   - PT 2 times a week for 2 weeks; 50% WBAT with AVOID sudden and extreme movement of your hip (surgical leg). - PT/INR Every Monday/Thursday for 2 weeks, Call Dr. Tenzin Bennett with results ([phone]). - Remove staples at 2 weeks post-op; 11/22/17 .   - Follow up in Office in 2 1/2 weeks. After Hospital Care Plan:  Discharge Instructions Hip Replacement-Dr. Tenzin Bennett    Patient Name: Padmini Nearing  Date of procedure: 11/8/2017    Procedure: Procedure(s):  RIGHT TOTAL HIP ARTHROPLASTY   Surgeon: Ofe Vázquez and Role:     * Viktoria Schmidt MD - Primary     PCP: Derek Gibson MD  Date of discharge: No discharge date for patient encounter. Follow up appointments   Follow up with Dr. Tenzin Bennett in 2 ½ weeks. Call [phone] to make an appointment.  If home health has been arranged for you the agency will contact you to arrange dates/times for visits. Please call them if you do not hear from them within 24 hours after you are discharged    When to call your Orthopaedic Surgeon: Call [phone].  If you call after 5pm or on a weekend, the on call physician will be contacted   Increased hip pain, unrelieved pain or if you have difficulty or are unable to walk   Signs of infection-if your incision is red; continues to have drainage; drainage has a foul odor or if you have a persistent fever over 101 degrees   Signs of a blood clot in your leg-calf pain, tenderness, redness, swelling of lower leg    When to call your Primary Care Physician:   Concerns about medical conditions such as diabetes, high blood pressure, asthma, congestive heart failure   Call if blood sugars are elevated, persistent headache or dizziness, coughing or congestion, constipation or diarrhea, burning with urination, abnormal heart rate    When to call 911and go to the nearest emergency room   acute onset of chest pain, shortness of breath, difficulty breathing    Activity   50% weight bearing with walker or crutches for 2 weeks, then as tolerated. Refer to pages 23-33 of your handbook for instructions and pictures   Complete you Home Exercise Program daily as instructed by your therapist. Refer to pages 36-42 of your handbook for instructions and pictures   Get up every one hour and walk (except at night when sleeping)   AVOID sudden and extreme movement of your hip (surgical leg)   Do not drive or operate heavy machinery    Incision Care   The Aquacel (brown, waterproof) surgical dressing is to remain on your hip for 7 days. On the 7th day have someone gently peel the dressing off by carefully lifting the edge and stretching it slightly to break the adhesive seal   You will have staples in your hip incision. They will be removed by the home health agency staff   If your Aquacel dressing comes loose/off before the 7th day, you may replace it with a dry sterile gauze dressing; change it daily. Once your incision is not draining, you may leave it open to air   You may take a shower with the Aquacel dressing in place. Once the Aquacel is removed, you may shower and get your incision wet but do not submerge your incision under water in a bath tub, hot tub or swimming pool for 6 weeks after surgery. Preventing blood clots   Take Coumadin as prescribed by Dr. Deandre Marsh for one month following surgery   Wear elastic stockings (TEDS) for 4 weeks.   You should remove them for approximately 1 hour daily for showering/sponge bathing    Pain management   Take pain medication as prescribed; decrease the amount you use as your pain lessens   Avoid alcoholic beverages while taking pain medication   Please be aware that many medications contain Tylenol. We do not want you to over medicate so please read the information below as a guide. Do not take more than 3 Grams of Tylenol in a 24 hour period. (There are 1000 milligrams in one Gram)  o  325 mg of Tylenol per tablet (do not take more than 9 tablets in 24 hours)  o  500 mg of Tylenol per tablet (do not take more than 6 tablets in 24 hours)  o  650 mg of Tylenol per tablet (do not take more than 4 tablets in 24 hours).  You may place an ice bag on your hip for 15-20 minutes after exercising and as needed throughout the day and night    Diet   Resume usual diet; drink plenty of fluids; eat foods high in fiber   You may want to take a stool softener (such as Senokot-S or Colace) to prevent constipation while you are taking pain medication. If constipation occurs, take a laxative (such as Dulcolax tablets, Milk of Magnesia, or a suppository)    [facility] Protocol (to be followed by [address] [place] [address])  Nursing-see physicians order   Draw a PT/INR per physicians order. Call results to Dr. Swati Zepeda at [phone]  [facility] Remove staples per physicians order   Complete head to toe assessment, vital signs   Medication reconciliation   Review pain management   Manage chronic medical conditions    Physical Therapy-see physician's order     Weight bearing status:  Precautions at Admission: Fall, PWB (50%)  Left Side Weight Bearing: Full  Right Side Weight Bearing: Partial (%) (50%)  ?   Mobility Status:  Supine to Sit: Supervision  Sit to Stand: Stand-by asssistance (verbal cues for hand placement)          Gait:  Distance (ft): 200 Feet (ft)  Ambulation - Level of Assistance: Contact guard assistance, Stand-by asssistance  Assistive Device: Walker, rolling, Gait belt  Gait Abnormalities: Antalgic, Decreased step clearance    ADL status overall composite:     Dressing Assistance: Supervision/set up     Toilet Transfer : Contact guard assistance     Physical Therapy   Assessment and evaluation-bed mobility; functional transfers (bed, chair, bathroom, stairs); ambulation with equipment, car transfers, safety and ability to get out of house in the event of an emergency   50% weight bearing x 2 weeks then weight bearing as tolerated   AVOID sudden and extreme movement of the hip (surgical leg)   Discuss pain management   Review how to do ADLs.   Refer to pages 43-47 of handbook    Home Exercise Program-refer to pages 36-42 of handbook

## 2017-11-10 NOTE — PROGRESS NOTES
[facility]  SBAR Bundled Payment Handoff     FROM:                                TO: Home                                                      ([address] [place] [address] or Facility name)  [address]  [address] [id number]  Dept: [address]: [phone]                                      Room#:  [phone]                                                      Discharging Nurse:  Dell Chau RN  Unit PO#:[phone]         SITUATION      ASAScore: ASA 1 - Normal health patient    Admitted:  11/8/2017  Hospital Day: 3      Attending Provider:  Jan Valle MD     Consultations:  None    PCP:  Eloisa Zimmer MD   [phone]     Admitting Dx:  DJD RIGHT HIP  Primary osteoarthritis of right hip       Principal Problem:    Primary osteoarthritis of right hip (11/6/2017)      2 Days Post-Op of   Procedure(s):  RIGHT TOTAL HIP ARTHROPLASTY    BY: Jan Valle MD             ON: 11/8/2017                  Code Status: Full Code             Advance Directive? Yes Not W Pt (Send w/patient)     Isolation:  There are currently no Active Isolations       MDRO: No current active infections    BACKGROUND     Allergies: Allergies   Allergen Reactions    Codeine Other (comments)     HALLUCINATIONS         Past Medical History:   Diagnosis Date    Acid reflux     Arthritis     PUD (peptic ulcer disease)     CHILDHOOD       Past Surgical History:   Procedure Laterality Date    HX HEENT  1976    MALOCCLUSION     HX HYSTEROSCOPY  2005    HX ROTATOR CUFF REPAIR Right 2008       Prior to Admission Medications   Prescriptions Last Dose Informant Patient Reported? Taking?   amitriptyline (ELAVIL) 100 mg tablet 11/8/2017 at 0700  Yes Yes   Sig: Take 100 mg by mouth nightly. aspirin 500 mg tablet 10/25/2017  Yes No   Sig: Take 1,000 mg by mouth every three (3) days.    multivitamin (ONE A DAY) tablet 10/8/2017 at Unknown time  Yes Yes   Sig: Take 1 Tab by mouth daily. naproxen sodium (ALEVE) 220 mg cap 10/25/2017  Yes No   Sig: Take 800 mg by mouth every three (3) days. warfarin (COUMADIN) 5 mg tablet 11/7/2017 at Unknown time  Yes Yes   Sig: Take 1 tablet po the night before surgery      Facility-Administered Medications: None       Vaccinations: There is no immunization history on file for this patient. ASSESSMENT   Age: 74 y.o. Gender: female        Height: Height: 5' 5\" (165.1 cm)                    Weight:Weight: 92.5 kg (203 lb 14.8 oz)     Patient Vitals for the past 8 hrs:   Temp Pulse Resp BP SpO2   11/10/17 0824 98 °F (36.7 °C) 82 16 120/64 95 %            Active Orders   Diet    DIET REGULAR       Orientation: Orientation Level: Oriented X4    Active Lines/Drains:  (Peg Tube / Ghosh / CL or S/L?):no    Urinary Status: Voiding      Last BM: Last Bowel Movement Date: 11/07/17     Skin Integrity: Incision (comment) (R hip; dressing c/d/i)   Wound Hip Right;Lateral-DRESSING STATUS: Clean, dry, and intact    Wound Hip Right;Lateral-DRESSING TYPE: Silver products (aquacel)    Mobility: Slightly limited   Weight Bearing Status: WBAT (Weight Bearing as Tolerated)      Gait Training  Assistive Device: Walker, rolling, Gait belt  Ambulation - Level of Assistance: Supervision, Modified independent  Distance (ft): 250 Feet (ft)  Stairs - Level of Assistance: Contact guard assistance  Rail Use: Right  (cane L)     On Anticoagulation?  YES  Coumadin                                      Last dose:  11/10/2017at 0937    Pain Medications given:  Tramadol 50 mg                             Last dose: 11/9/17 at  1508    Lab Results   Component Value Date/Time    Glucose 146 11/09/2017 03:05 AM    Hemoglobin A1c 5.7 11/02/2017 12:19 PM    INR 1.6 11/10/2017 03:39 AM    INR 1.1 11/09/2017 03:05 AM    HGB 7.9 11/10/2017 03:39 AM    HGB 8.7 11/09/2017 03:05 AM    HGB 13.0 11/02/2017 12:19 PM       Readmission Risks:  Score:         RECOMMENDATION     See After Visit Summary (AVS) for:  · Discharge instructions  · After [address]   · Medication Reconciliation          [facility] Orthopaedic Nurse Navigator  SHARLA Trevino, RN-BC       Office  [phone]  Cell      [phone]  Fax      [phone]  [email or web address]             . Shivay

## 2017-11-10 NOTE — PROGRESS NOTES
Bedside and Verbal shift change report given to Abi Keating RN (oncoming nurse) by Krysta iN RN (offgoing nurse). Report included the following information SBAR, Kardex, OR Summary, Intake/Output, MAR and Recent Results.

## 2017-11-10 NOTE — PROGRESS NOTES
Problem: Mobility Impaired (Adult and Pediatric)  Goal: *Acute Goals and Plan of Care (Insert Text)  Physical Therapy Goals  Initiated 11/9/2017    1. Patient will move from supine to sit and sit to supine , scoot up and down and roll side to side in bed with modified independence within 4 days. 2. Patient will perform sit to stand with modified independence within 4 days. 3. Patient will ambulate with modified independence for 150 feet with the least restrictive device within 4 days. 4. Patient will ascend/descend 8 stairs with bilateral handrail(s) with modified independence within 4 days. 5. Patient will perform hip home exercise program per protocol with independence within 4 days. physical Therapy TREATMENT  Patient: Ximena Chapa (14 y.o. female)  Date: 11/10/2017  Diagnosis: DJD RIGHT HIP  Primary osteoarthritis of right hip Primary osteoarthritis of right hip  Procedure(s) (LRB):  RIGHT TOTAL HIP ARTHROPLASTY  (Right) 2 Days Post-Op  Precautions: Fall, PWB (50%)    ASSESSMENT:  Patient received supine in bed, agreeable to therapy. Able to increase ambulation distance and complete stair training x8 with one handrail and use of cane. Moderate cues for sequencing. Ambulated with increased trunk sway, however step length becoming more symmetrical. Returned to room and supine in bed. No further questions for d/c and patient with good understanding of exercises. Clear for d/c home with HHPT. Progression toward goals:  [x]      Improving appropriately and progressing toward goals  []      Improving slowly and progressing toward goals  []      Not making progress toward goals and plan of care will be adjusted     PLAN:  Patient continues to benefit from skilled intervention to address the above impairments. Continue treatment per established plan of care.   Discharge Recommendations:  Home Health  Further Equipment Recommendations for Discharge:  Patient owns     SUBJECTIVE:   Patient stated I'll go to the end of the kinney like yesterday.     OBJECTIVE DATA SUMMARY:   Critical Behavior:  Neurologic State: Alert  Orientation Level: Oriented X4  Cognition: Follows commands  Functional Mobility Training:  Bed Mobility:  Supine to Sit: Modified independent  Sit to Supine: Modified independent  Scooting: Modified independent  Transfers:  Sit to Stand: Supervision  Stand to Sit: Supervision  Balance:  Sitting: Intact  Standing: Intact; With support  Ambulation/Gait Training:  Distance (ft): 250 Feet (ft)  Assistive Device: Walker, rolling;Gait belt  Ambulation - Level of Assistance: Supervision;Modified independent  Gait Abnormalities: Antalgic;Decreased step clearance;Trunk sway increased  Right Side Weight Bearing: Partial (%) (50%)  Left Side Weight Bearing: Full  Base of Support: Widened  Stance: Right decreased  Speed/[place]: Pace decreased (<100 feet/min)  Step Length: Right shortened;Left shortened  Swing Pattern: Right asymmetrical  Stairs - Level of Assistance: Contact guard assistance  Rail Use: Right  (cane L)    Therapeutic Exercises:   Patient with no additional questions regarding exercises and good understanding. Pain:  Pain Scale 1: Numeric (0 - 10)  Pain Intensity 1: 0  Activity Tolerance:   Good  Please refer to the flowsheet for vital signs taken during this treatment.   After treatment:   []  Patient left in no apparent distress sitting up in chair  [x]  Patient left in no apparent distress in bed  [x]  Call bell left within reach  [x]  Nursing notified  []  Caregiver present  []  Bed alarm activated    COMMUNICATION/COLLABORATION:   The patients plan of care was discussed with: Registered Nurse    Sunday Oseguera, PT, DPT   Time Calculation: 17 mins

## 2017-11-10 NOTE — PROGRESS NOTES
TOTAL HIP PROGRESS NOTE      Subjective:     Post-Operative Day: 2 Status Post right Total Hip Arthroplasty  Systemic or Specific Complaints:No Complaints    Objective:     Patient Vitals for the past 24 hrs:   BP Temp Pulse Resp SpO2 Weight   11/10/17 0824 120/64 98 °F (36.7 °C) 82 16 95 % -   11/10/17 0346 - - - - - 92.5 kg (203 lb 14.8 oz)   11/10/17 0302 114/63 97.8 °F (36.6 °C) 80 14 98 % -   11/09/17 2031 107/55 98.1 °F (36.7 °C) 94 16 96 % -   11/09/17 1515 119/69 98.5 °F (36.9 °C) 91 16 100 % -   11/09/17 1021 99/65 98.2 °F (36.8 °C) 97 16 97 % -   11/09/17 0921 107/70 - (!) 117 - - -   11/09/17 0912 129/66 - - - - -       General: alert, cooperative, no distress, appears stated age   Wound: Wound clean and dry no evidence of infection. , No Erythema, No Edema, No Drainage and Wound Intact   Motion: Painless Range of Motion   DVT Exam: No evidence of DVT seen on physical exam.  Negative Trent's sign. No cords or calf tenderness. No significant calf/ankle edema. Data Review:    Recent Results (from the past 24 hour(s))   HEMOGLOBIN    Collection Time: 11/10/17  3:39 AM   Result Value Ref Range    HGB 7.9 (L) 11.5 - 16.0 g/dL   PROTHROMBIN TIME + INR    Collection Time: 11/10/17  3:39 AM   Result Value Ref Range    INR 1.6 (H) 0.9 - 1.1      Prothrombin time 16.5 (H) 9.0 - 11.1 sec         Assessment:     Status Post right Total Hip Arthroplasty. Doing well postoperatively. .  Bandage aquacell, clean/dyr. Labs stable. PT progressing well. Pain control WNL. Plan:     Please change aquacell before discharge  Continues current post-op course. Continue PT per protocol. Plan to discharge to home [facility] today.

## 2017-11-11 ENCOUNTER — HOME CARE VISIT (OUTPATIENT)
Dept: SCHEDULING | Facility: HOME HEALTH | Age: 67
End: 2017-11-11
Payer: MEDICARE

## 2017-11-11 VITALS
BODY MASS INDEX: 34.15 KG/M2 | HEART RATE: 73 BPM | DIASTOLIC BLOOD PRESSURE: 76 MMHG | HEIGHT: 64 IN | OXYGEN SATURATION: 99 % | RESPIRATION RATE: 20 BRPM | WEIGHT: 200 LBS | SYSTOLIC BLOOD PRESSURE: 136 MMHG | TEMPERATURE: 97.3 F

## 2017-11-11 PROCEDURE — 3331090002 HH PPS REVENUE DEBIT

## 2017-11-11 PROCEDURE — 400013 HH SOC

## 2017-11-11 PROCEDURE — G0151 HHCP-SERV OF PT,EA 15 MIN: HCPCS

## 2017-11-11 PROCEDURE — 3331090001 HH PPS REVENUE CREDIT

## 2017-11-11 PROCEDURE — G0299 HHS/HOSPICE OF RN EA 15 MIN: HCPCS

## 2017-11-12 PROCEDURE — 3331090002 HH PPS REVENUE DEBIT

## 2017-11-12 PROCEDURE — 3331090001 HH PPS REVENUE CREDIT

## 2017-11-13 ENCOUNTER — HOME CARE VISIT (OUTPATIENT)
Dept: SCHEDULING | Facility: HOME HEALTH | Age: 67
End: 2017-11-13
Payer: MEDICARE

## 2017-11-13 VITALS
OXYGEN SATURATION: 97 % | DIASTOLIC BLOOD PRESSURE: 76 MMHG | SYSTOLIC BLOOD PRESSURE: 132 MMHG | TEMPERATURE: 98.2 F | RESPIRATION RATE: 18 BRPM | HEART RATE: 78 BPM

## 2017-11-13 VITALS
HEART RATE: 89 BPM | SYSTOLIC BLOOD PRESSURE: 118 MMHG | RESPIRATION RATE: 16 BRPM | DIASTOLIC BLOOD PRESSURE: 60 MMHG | TEMPERATURE: 98.4 F | OXYGEN SATURATION: 99 %

## 2017-11-13 VITALS
RESPIRATION RATE: 18 BRPM | SYSTOLIC BLOOD PRESSURE: 118 MMHG | OXYGEN SATURATION: 99 % | DIASTOLIC BLOOD PRESSURE: 72 MMHG | TEMPERATURE: 97 F | HEART RATE: 71 BPM

## 2017-11-13 LAB
INR BLD: 1.2 (ref 0.9–1.1)
PT POC: 14.5 SECONDS (ref 11.8–14.9)

## 2017-11-13 PROCEDURE — 3331090001 HH PPS REVENUE CREDIT

## 2017-11-13 PROCEDURE — G0151 HHCP-SERV OF PT,EA 15 MIN: HCPCS

## 2017-11-13 PROCEDURE — G0300 HHS/HOSPICE OF LPN EA 15 MIN: HCPCS

## 2017-11-13 PROCEDURE — 3331090002 HH PPS REVENUE DEBIT

## 2017-11-13 NOTE — DISCHARGE SUMMARY
Discharge Summary    Physician: Lobo Maier MD    Physician Assistant: Akhil Kong PA-C    Admit Diagnosis:  DJD RIGHT HIP  Primary osteoarthritis of right hip    Final Diagnosis:   1. Advanced degenerative arthritis of right hip . Procedure: Right total hip replacement    Complications: None. History of Present Illness: The patient has a long-standing history of pain within the right hip . The patient has severe degenerative arthritis of the right hip and has exhausted conservative therapy at this time including prior intra-articular injections, activity modifications, OTC NSAIDS. The patient has been limited in their ability to perform ADLs, walk short distances or climb steps appropriately. The patient presents for the above-mentioned procedure. The patient has been cleared from a medical and cardiac standpoint. Past Medical History:  Recorded in the chart. Physical Examination: Also recorded in the chart. The patient is noted for ambulating with an antalgic gait favoring the right side. Examination of the right hip reveals she ambulates with a marked limp. Any attempts to rotate the hip are painful. Her leg lengths are basically equal. She has a 10 degree flexion contracture on the right hip. No signs of infection. No effusion. No cellulitis or rash. No evidence of calf pain, no sign of DVT. The neurovascular status is intact . X-rays reveal marked degenerative arthritis of the right hip with bone on bone contact. Course in the Hospital:  The patient was admitted to the hospital.  The Pt. Underwent a right total hip replacement . Postoperatively, the pt. did well. The pt. Remained stable from a medical standpoint. The patient ambulated, 50% WBAT weightbearing within the hospital with Physical Therapy. The patient continued with this therapy at [facility] with PT. The patient began taking Coumadin pre-operatively for DVT prophylaxis and will continue on this for one month.  At the time of discharge, there was no evidence of any DVT noted. The patient had negative Homans signs bilateral lower extremities. At the time of discharge, the patient's right hip incision appeared with staples intact. No signs of infection or inflammation noted. The patient will follow up in our office in 2-1/2 weeks. DC med list:  Discharge Medication List as of 11/10/2017 12:38 PM      START taking these medications    Details   celecoxib (CELEBREX) 200 mg capsule Take 1 Cap by mouth daily for 30 days. , Print, Disp-30 Cap, R-0      traMADol (ULTRAM) 50 mg tablet Take 1 Tab by mouth every six (6) hours as needed for Pain. Max Daily Amount: 200 mg., Print, Disp-60 Tab, R-0         CONTINUE these medications which have CHANGED    Details   warfarin (COUMADIN) 2.5 mg tablet Take 1 Tab by mouth daily. , Print, Disp-90 Tab, R-1         CONTINUE these medications which have NOT CHANGED    Details   amitriptyline (ELAVIL) 100 mg tablet Take 100 mg by mouth nightly., Historical Med      multivitamin (ONE A DAY) tablet Take 1 Tab by mouth daily. , Historical Med         STOP taking these medications       naproxen sodium (ALEVE) 220 mg cap Comments:   Reason for Stopping:         aspirin 500 mg tablet Comments:   Reason for Stopping:               Patient Disposition: Home  Followup Care:  Discharge Condition: good  PT/OT per Home Health  Regular Diet  As directed    Follow-up Information     Follow up With Details Comments Contact Info    Hasmukh Prakash MD   [address] [place] [address] [address]   [address]  1[address] Floor  [facility] [address]                  Sameer Ramesh PA-C

## 2017-11-14 PROCEDURE — 3331090002 HH PPS REVENUE DEBIT

## 2017-11-14 PROCEDURE — 3331090001 HH PPS REVENUE CREDIT

## 2017-11-15 PROCEDURE — 3331090001 HH PPS REVENUE CREDIT

## 2017-11-15 PROCEDURE — 3331090002 HH PPS REVENUE DEBIT

## 2017-11-16 ENCOUNTER — HOME CARE VISIT (OUTPATIENT)
Dept: SCHEDULING | Facility: HOME HEALTH | Age: 67
End: 2017-11-16
Payer: MEDICARE

## 2017-11-16 VITALS
TEMPERATURE: 97.3 F | OXYGEN SATURATION: 95 % | HEART RATE: 77 BPM | SYSTOLIC BLOOD PRESSURE: 148 MMHG | RESPIRATION RATE: 20 BRPM | DIASTOLIC BLOOD PRESSURE: 80 MMHG

## 2017-11-16 VITALS
OXYGEN SATURATION: 99 % | HEART RATE: 80 BPM | SYSTOLIC BLOOD PRESSURE: 130 MMHG | TEMPERATURE: 98.2 F | RESPIRATION RATE: 16 BRPM | DIASTOLIC BLOOD PRESSURE: 66 MMHG

## 2017-11-16 LAB
INR BLD: 1.3 (ref 0.9–1.1)
PT POC: 15.8 SECONDS (ref 11.8–14.9)

## 2017-11-16 PROCEDURE — G0300 HHS/HOSPICE OF LPN EA 15 MIN: HCPCS

## 2017-11-16 PROCEDURE — G0151 HHCP-SERV OF PT,EA 15 MIN: HCPCS

## 2017-11-16 PROCEDURE — 3331090001 HH PPS REVENUE CREDIT

## 2017-11-16 PROCEDURE — 3331090002 HH PPS REVENUE DEBIT

## 2017-11-17 PROCEDURE — 3331090002 HH PPS REVENUE DEBIT

## 2017-11-17 PROCEDURE — 3331090001 HH PPS REVENUE CREDIT

## 2017-11-18 PROCEDURE — 3331090001 HH PPS REVENUE CREDIT

## 2017-11-18 PROCEDURE — 3331090002 HH PPS REVENUE DEBIT

## 2017-11-19 PROCEDURE — 3331090002 HH PPS REVENUE DEBIT

## 2017-11-19 PROCEDURE — 3331090001 HH PPS REVENUE CREDIT

## 2017-11-20 ENCOUNTER — HOME CARE VISIT (OUTPATIENT)
Dept: SCHEDULING | Facility: HOME HEALTH | Age: 67
End: 2017-11-20
Payer: MEDICARE

## 2017-11-20 VITALS
TEMPERATURE: 97.8 F | HEART RATE: 72 BPM | DIASTOLIC BLOOD PRESSURE: 72 MMHG | RESPIRATION RATE: 18 BRPM | SYSTOLIC BLOOD PRESSURE: 130 MMHG | OXYGEN SATURATION: 97 %

## 2017-11-20 VITALS
RESPIRATION RATE: 16 BRPM | SYSTOLIC BLOOD PRESSURE: 130 MMHG | OXYGEN SATURATION: 98 % | DIASTOLIC BLOOD PRESSURE: 84 MMHG | TEMPERATURE: 97.7 F | HEART RATE: 74 BPM

## 2017-11-20 LAB
INR BLD: 1.5 (ref 0.9–1.1)
PT POC: 17.9 SECONDS (ref 11.8–14.9)

## 2017-11-20 PROCEDURE — 3331090002 HH PPS REVENUE DEBIT

## 2017-11-20 PROCEDURE — G0300 HHS/HOSPICE OF LPN EA 15 MIN: HCPCS

## 2017-11-20 PROCEDURE — 3331090001 HH PPS REVENUE CREDIT

## 2017-11-20 PROCEDURE — G0151 HHCP-SERV OF PT,EA 15 MIN: HCPCS

## 2017-11-21 PROCEDURE — 3331090002 HH PPS REVENUE DEBIT

## 2017-11-21 PROCEDURE — 3331090001 HH PPS REVENUE CREDIT

## 2017-11-22 ENCOUNTER — HOME CARE VISIT (OUTPATIENT)
Dept: SCHEDULING | Facility: HOME HEALTH | Age: 67
End: 2017-11-22
Payer: MEDICARE

## 2017-11-22 VITALS
DIASTOLIC BLOOD PRESSURE: 71 MMHG | HEART RATE: 78 BPM | SYSTOLIC BLOOD PRESSURE: 121 MMHG | OXYGEN SATURATION: 98 % | RESPIRATION RATE: 16 BRPM | TEMPERATURE: 98.7 F

## 2017-11-22 LAB
INR BLD: 1.4 (ref 0.9–1.1)
PT POC: 16.9 SECONDS (ref 11.8–14.9)

## 2017-11-22 PROCEDURE — 3331090002 HH PPS REVENUE DEBIT

## 2017-11-22 PROCEDURE — G0299 HHS/HOSPICE OF RN EA 15 MIN: HCPCS

## 2017-11-22 PROCEDURE — 3331090001 HH PPS REVENUE CREDIT

## 2019-12-03 NOTE — ROUTINE PROCESS
Patient: Dorothy Dean MRN: [id number]  SSN: [id number]   YOB: 1950  Age: 74 y.o. Sex: female     Patient is status post Procedure(s):  RIGHT TOTAL HIP ARTHROPLASTY . Surgeon(s) and Role:     * Gutierrez Timmons MD - Primary    Local/Dose/Irrigation: Optime mixture                  Peripheral IV 11/08/17 Left Hand (Active)   Dressing Status Clean, dry, & intact 11/8/2017 10:21 AM   Dressing Type Transparent 11/8/2017 10:21 AM   Hub Color/Line Status Infusing 11/8/2017 10:21 AM          Hemovac Left; Anterior Hip (Active)   Site Assessment Clean, dry, & intact 11/8/2017  1:38 PM   Dressing Status Clean, dry, & intact 11/8/2017  1:38 PM   Drainage Description Serosanguinous 11/8/2017  1:38 PM   Status Patent 11/8/2017  1:38 PM                     Dressing/Packing:  Wound Hip Right;Lateral-DRESSING TYPE: Staples;Silver products (11/08/17 1343)  Splint/Cast:  ]    Other: Hydroquinone Counseling:  Patient advised that medication may result in skin irritation, lightening (hypopigmentation), dryness, and burning.  In the event of skin irritation, the patient was advised to reduce the amount of the drug applied or use it less frequently.  Rarely, spots that are treated with hydroquinone can become darker (pseudoochronosis).  Should this occur, patient instructed to stop medication and call the office. The patient verbalized understanding of the proper use and possible adverse effects of hydroquinone.  All of the patient's questions and concerns were addressed.

## (undated) DEVICE — STERILE POLYISOPRENE POWDER-FREE SURGICAL GLOVES WITH EMOLLIENT COATING: Brand: PROTEXIS

## (undated) DEVICE — SOLUTION IRRIG 1000ML H2O STRL BLT

## (undated) DEVICE — TOWEL SURG W17XL27IN STD BLU COT NONFENESTRATED PREWASHED

## (undated) DEVICE — COVER,MAYO STAND,STERILE: Brand: MEDLINE

## (undated) DEVICE — SLIM BODY SKIN STAPLER: Brand: APPOSE ULC

## (undated) DEVICE — CONTAINER,SPECIMEN,STRL PATH,4OZ: Brand: MEDLINE

## (undated) DEVICE — PAD 05IN BASE 3IN PEAK M DENS CONVOLUTED FOAM

## (undated) DEVICE — GAUZE SPONGES,8 PLY: Brand: CURITY

## (undated) DEVICE — SUTURE VCRL SZ 2-0 L36IN ABSRB UD L40MM CT 1/2 CIR J957H

## (undated) DEVICE — DRAIN KT WND 10FR RND 400ML --

## (undated) DEVICE — SCRUB DRY SURG EZ SCRUB BRUSH PREOPERATIVE GRN

## (undated) DEVICE — Device

## (undated) DEVICE — T4 HOOD

## (undated) DEVICE — BLADE ELECTRODE: Brand: EDGE

## (undated) DEVICE — 4-PORT MANIFOLD: Brand: NEPTUNE 2

## (undated) DEVICE — SUTURE STRATAFIX SYMMETRIC PDS + SZ 1 L18IN ABSRB VLT L48MM SXPP1A400

## (undated) DEVICE — DEVON™ KNEE AND BODY STRAP 60" X 3" (1.5 M X 7.6 CM): Brand: DEVON

## (undated) DEVICE — NEEDLE HYPO 22GA L1.5IN BLK S STL HUB POLYPR SHLD REG BVL

## (undated) DEVICE — SUTURE VCRL SZ 1 L27IN ABSRB VLT L36MM CT-1 1/2 CIR J341H

## (undated) DEVICE — (D)PREP SKN CHLRAPRP APPL 26ML -- CONVERT TO ITEM 371833

## (undated) DEVICE — DRAPE,REIN 53X77,STERILE: Brand: MEDLINE

## (undated) DEVICE — PATIENT PROTECTIVE PAD FOR IMP UNIVERSAL LATERAL HIP POSITIONER (ULP) (6/CASE): Brand: PATIENT PROTECTIVE PAD

## (undated) DEVICE — INFECTION CONTROL KIT SYS

## (undated) DEVICE — TRAY CATH 16F URIN MTR LTX -- CONVERT TO ITEM 363111

## (undated) DEVICE — 3M™ IOBAN™ 2 ANTIMICROBIAL INCISE DRAPE 6651EZ: Brand: IOBAN™ 2

## (undated) DEVICE — SOLUTION IRRIG 3000ML 0.9% SOD CHL FLX CONT 0797208] ICU MEDICAL INC]

## (undated) DEVICE — STERILE POLYISOPRENE POWDER-FREE SURGICAL GLOVES: Brand: PROTEXIS

## (undated) DEVICE — DRSG AQUACEL SURG 3.5 X 10IN -- CONVERT TO ITEM 370183

## (undated) DEVICE — SYR LR LCK 1ML GRAD NSAF 30ML --

## (undated) DEVICE — REM POLYHESIVE ADULT PATIENT RETURN ELECTRODE: Brand: VALLEYLAB

## (undated) DEVICE — BLADE SAW W073XL276IN THK0031IN CUT THK0036IN REPL SAG

## (undated) DEVICE — KENDALL SCD EXPRESS SLEEVES, KNEE LENGTH, MEDIUM: Brand: KENDALL SCD

## (undated) DEVICE — HANDPIECE SET WITH BONE CLEANING TIP AND SUCTION TUBE: Brand: INTERPULSE